# Patient Record
Sex: MALE | Race: WHITE | NOT HISPANIC OR LATINO | Employment: FULL TIME | URBAN - METROPOLITAN AREA
[De-identification: names, ages, dates, MRNs, and addresses within clinical notes are randomized per-mention and may not be internally consistent; named-entity substitution may affect disease eponyms.]

---

## 2019-11-13 ENCOUNTER — HOSPITAL ENCOUNTER (INPATIENT)
Facility: HOSPITAL | Age: 59
LOS: 2 days | Discharge: HOME | DRG: 246 | End: 2019-11-15
Attending: EMERGENCY MEDICINE | Admitting: INTERNAL MEDICINE
Payer: COMMERCIAL

## 2019-11-13 DIAGNOSIS — I24.9 ACS (ACUTE CORONARY SYNDROME): ICD-10-CM

## 2019-11-13 DIAGNOSIS — R07.9 CHEST PAIN: ICD-10-CM

## 2019-11-13 DIAGNOSIS — I46.9 CARDIAC ARREST: Primary | ICD-10-CM

## 2019-11-13 DIAGNOSIS — I24.9 ACUTE ISCHEMIC HEART DISEASE, UNSPECIFIED: ICD-10-CM

## 2019-11-13 DIAGNOSIS — I25.10 CAD (CORONARY ARTERY DISEASE): ICD-10-CM

## 2019-11-13 PROBLEM — I25.700 CORONARY ARTERY DISEASE INVOLVING CORONARY BYPASS GRAFT OF NATIVE HEART WITH UNSTABLE ANGINA PECTORIS: Status: ACTIVE | Noted: 2019-11-13

## 2019-11-13 LAB
ALBUMIN SERPL BCP-MCNC: 4.6 G/DL (ref 3.5–5.2)
ALP SERPL-CCNC: 64 U/L (ref 55–135)
ALT SERPL W/O P-5'-P-CCNC: 25 U/L (ref 10–44)
ANION GAP SERPL CALC-SCNC: 13 MMOL/L (ref 8–16)
AST SERPL-CCNC: 43 U/L (ref 10–40)
BASOPHILS # BLD AUTO: 0.04 K/UL (ref 0–0.2)
BASOPHILS NFR BLD: 0.3 % (ref 0–1.9)
BILIRUB SERPL-MCNC: 1.2 MG/DL (ref 0.1–1)
BNP SERPL-MCNC: 25 PG/ML (ref 0–99)
BUN SERPL-MCNC: 15 MG/DL (ref 6–20)
CALCIUM SERPL-MCNC: 9.8 MG/DL (ref 8.7–10.5)
CHLORIDE SERPL-SCNC: 105 MMOL/L (ref 95–110)
CO2 SERPL-SCNC: 22 MMOL/L (ref 23–29)
CORONARY STENOSIS: ABNORMAL
CORONARY STENT: YES
CREAT SERPL-MCNC: 1 MG/DL (ref 0.5–1.4)
DIASTOLIC DYSFUNCTION: NO
DIFFERENTIAL METHOD: ABNORMAL
EOSINOPHIL # BLD AUTO: 0.1 K/UL (ref 0–0.5)
EOSINOPHIL NFR BLD: 0.6 % (ref 0–8)
ERYTHROCYTE [DISTWIDTH] IN BLOOD BY AUTOMATED COUNT: 12.3 % (ref 11.5–14.5)
EST. GFR  (AFRICAN AMERICAN): >60 ML/MIN/1.73 M^2
EST. GFR  (NON AFRICAN AMERICAN): >60 ML/MIN/1.73 M^2
ESTIMATED PA SYSTOLIC PRESSURE: 26.83
GLUCOSE SERPL-MCNC: 114 MG/DL (ref 70–110)
HCT VFR BLD AUTO: 40.8 % (ref 40–54)
HCV AB SERPL QL IA: NEGATIVE
HGB BLD-MCNC: 13.6 G/DL (ref 14–18)
HIV 1+2 AB+HIV1 P24 AG SERPL QL IA: NEGATIVE
IMM GRANULOCYTES # BLD AUTO: 0.44 K/UL (ref 0–0.04)
IMM GRANULOCYTES NFR BLD AUTO: 3.1 % (ref 0–0.5)
LYMPHOCYTES # BLD AUTO: 0.9 K/UL (ref 1–4.8)
LYMPHOCYTES NFR BLD: 6 % (ref 18–48)
MCH RBC QN AUTO: 29.6 PG (ref 27–31)
MCHC RBC AUTO-ENTMCNC: 33.3 G/DL (ref 32–36)
MCV RBC AUTO: 89 FL (ref 82–98)
MONOCYTES # BLD AUTO: 0.8 K/UL (ref 0.3–1)
MONOCYTES NFR BLD: 5.8 % (ref 4–15)
NEUTROPHILS # BLD AUTO: 12.1 K/UL (ref 1.8–7.7)
NEUTROPHILS NFR BLD: 84.2 % (ref 38–73)
NRBC BLD-RTO: 0 /100 WBC
PLATELET # BLD AUTO: 237 K/UL (ref 150–350)
PMV BLD AUTO: 10.3 FL (ref 9.2–12.9)
POC ACTIVATED CLOTTING TIME K: 158 SEC (ref 74–137)
POC ACTIVATED CLOTTING TIME K: 252 SEC (ref 74–137)
POC ACTIVATED CLOTTING TIME K: 301 SEC (ref 74–137)
POTASSIUM SERPL-SCNC: 4.4 MMOL/L (ref 3.5–5.1)
PROT SERPL-MCNC: 7.7 G/DL (ref 6–8.4)
RBC # BLD AUTO: 4.59 M/UL (ref 4.6–6.2)
RETIRED EF AND QEF - SEE NOTES: 50 (ref 55–65)
SAMPLE: ABNORMAL
SODIUM SERPL-SCNC: 140 MMOL/L (ref 136–145)
TRICUSPID VALVE REGURGITATION: NORMAL
TROPONIN I SERPL DL<=0.01 NG/ML-MCNC: 18.5 NG/ML (ref 0–0.03)
TROPONIN I SERPL DL<=0.01 NG/ML-MCNC: <0.006 NG/ML (ref 0–0.03)
WBC # BLD AUTO: 14.31 K/UL (ref 3.9–12.7)

## 2019-11-13 PROCEDURE — 99152 CATH LAB PROCEDURE: ICD-10-PCS | Mod: ,,, | Performed by: INTERNAL MEDICINE

## 2019-11-13 PROCEDURE — 84484 ASSAY OF TROPONIN QUANT: CPT | Mod: 91

## 2019-11-13 PROCEDURE — 25500020 PHARM REV CODE 255

## 2019-11-13 PROCEDURE — 63600175 PHARM REV CODE 636 W HCPCS: Performed by: INTERNAL MEDICINE

## 2019-11-13 PROCEDURE — C1769 GUIDE WIRE: HCPCS

## 2019-11-13 PROCEDURE — 99223 PR INITIAL HOSPITAL CARE,LEVL III: ICD-10-PCS | Mod: 25,,, | Performed by: INTERNAL MEDICINE

## 2019-11-13 PROCEDURE — 86703 HIV-1/HIV-2 1 RESULT ANTBDY: CPT

## 2019-11-13 PROCEDURE — 96375 TX/PRO/DX INJ NEW DRUG ADDON: CPT

## 2019-11-13 PROCEDURE — 92973 CATH LAB PROCEDURE: ICD-10-PCS | Mod: LC,,, | Performed by: INTERNAL MEDICINE

## 2019-11-13 PROCEDURE — 25000003 PHARM REV CODE 250

## 2019-11-13 PROCEDURE — 85025 COMPLETE CBC W/AUTO DIFF WBC: CPT

## 2019-11-13 PROCEDURE — 99223 1ST HOSP IP/OBS HIGH 75: CPT | Mod: 25,,, | Performed by: INTERNAL MEDICINE

## 2019-11-13 PROCEDURE — 92937 CATH LAB PROCEDURE: ICD-10-PCS | Mod: LC,,, | Performed by: INTERNAL MEDICINE

## 2019-11-13 PROCEDURE — 36415 COLL VENOUS BLD VENIPUNCTURE: CPT

## 2019-11-13 PROCEDURE — 20000000 HC ICU ROOM

## 2019-11-13 PROCEDURE — 99223 PR INITIAL HOSPITAL CARE,LEVL III: ICD-10-PCS | Mod: ,,, | Performed by: NURSE PRACTITIONER

## 2019-11-13 PROCEDURE — 93459 CATH LAB PROCEDURE: ICD-10-PCS | Mod: 26,59,, | Performed by: INTERNAL MEDICINE

## 2019-11-13 PROCEDURE — 84484 ASSAY OF TROPONIN QUANT: CPT

## 2019-11-13 PROCEDURE — 99291 CRITICAL CARE FIRST HOUR: CPT | Mod: 25

## 2019-11-13 PROCEDURE — 86803 HEPATITIS C AB TEST: CPT

## 2019-11-13 PROCEDURE — 99223 1ST HOSP IP/OBS HIGH 75: CPT | Mod: ,,, | Performed by: NURSE PRACTITIONER

## 2019-11-13 PROCEDURE — 25000003 PHARM REV CODE 250: Performed by: NURSE PRACTITIONER

## 2019-11-13 PROCEDURE — 93010 ELECTROCARDIOGRAM REPORT: CPT | Mod: ,,, | Performed by: INTERNAL MEDICINE

## 2019-11-13 PROCEDURE — 92937 PRQ TRLUML REVSC CAB GRF 1: CPT | Mod: RC,,, | Performed by: INTERNAL MEDICINE

## 2019-11-13 PROCEDURE — 93010 EKG 12-LEAD: ICD-10-PCS | Mod: ,,, | Performed by: INTERNAL MEDICINE

## 2019-11-13 PROCEDURE — 63600175 PHARM REV CODE 636 W HCPCS: Performed by: EMERGENCY MEDICINE

## 2019-11-13 PROCEDURE — 83880 ASSAY OF NATRIURETIC PEPTIDE: CPT

## 2019-11-13 PROCEDURE — 25000003 PHARM REV CODE 250: Performed by: EMERGENCY MEDICINE

## 2019-11-13 PROCEDURE — 92973 PRQ TRLUML C MCHN ASP THRMBC: CPT | Mod: LC,,, | Performed by: INTERNAL MEDICINE

## 2019-11-13 PROCEDURE — 99152 MOD SED SAME PHYS/QHP 5/>YRS: CPT

## 2019-11-13 PROCEDURE — 93005 ELECTROCARDIOGRAM TRACING: CPT

## 2019-11-13 PROCEDURE — 63600175 PHARM REV CODE 636 W HCPCS

## 2019-11-13 PROCEDURE — 99152 MOD SED SAME PHYS/QHP 5/>YRS: CPT | Mod: ,,, | Performed by: INTERNAL MEDICINE

## 2019-11-13 PROCEDURE — 93306 TTE W/DOPPLER COMPLETE: CPT | Mod: 26,,, | Performed by: INTERNAL MEDICINE

## 2019-11-13 PROCEDURE — 93459 L HRT ART/GRFT ANGIO: CPT | Mod: 26,59,, | Performed by: INTERNAL MEDICINE

## 2019-11-13 PROCEDURE — 25000003 PHARM REV CODE 250: Performed by: INTERNAL MEDICINE

## 2019-11-13 PROCEDURE — C1887 CATHETER, GUIDING: HCPCS

## 2019-11-13 PROCEDURE — 80053 COMPREHEN METABOLIC PANEL: CPT

## 2019-11-13 PROCEDURE — 93306 TTE W/DOPPLER COMPLETE: CPT

## 2019-11-13 PROCEDURE — 96374 THER/PROPH/DIAG INJ IV PUSH: CPT

## 2019-11-13 PROCEDURE — 99285 EMERGENCY DEPT VISIT HI MDM: CPT | Mod: 25

## 2019-11-13 PROCEDURE — 93306 2D ECHO WITH COLOR FLOW DOPPLER: ICD-10-PCS | Mod: 26,,, | Performed by: INTERNAL MEDICINE

## 2019-11-13 RX ORDER — LIDOCAINE 50 MG/G
1 PATCH TOPICAL
Status: DISCONTINUED | OUTPATIENT
Start: 2019-11-13 | End: 2019-11-15 | Stop reason: HOSPADM

## 2019-11-13 RX ORDER — EPINEPHRINE 0.1 MG/ML
INJECTION INTRAVENOUS CODE/TRAUMA/SEDATION MEDICATION
Status: COMPLETED | OUTPATIENT
Start: 2019-11-13 | End: 2019-11-13

## 2019-11-13 RX ORDER — CARVEDILOL 6.25 MG/1
6.25 TABLET ORAL 2 TIMES DAILY
Status: DISCONTINUED | OUTPATIENT
Start: 2019-11-13 | End: 2019-11-15 | Stop reason: HOSPADM

## 2019-11-13 RX ORDER — NITROGLYCERIN 0.4 MG/1
0.4 TABLET SUBLINGUAL EVERY 5 MIN PRN
Status: DISCONTINUED | OUTPATIENT
Start: 2019-11-13 | End: 2019-11-15 | Stop reason: HOSPADM

## 2019-11-13 RX ORDER — AMIODARONE HYDROCHLORIDE 150 MG/3ML
INJECTION, SOLUTION INTRAVENOUS CODE/TRAUMA/SEDATION MEDICATION
Status: COMPLETED | OUTPATIENT
Start: 2019-11-13 | End: 2019-11-13

## 2019-11-13 RX ORDER — ASPIRIN 325 MG
325 TABLET ORAL
Status: COMPLETED | OUTPATIENT
Start: 2019-11-13 | End: 2019-11-13

## 2019-11-13 RX ORDER — LOSARTAN POTASSIUM 25 MG/1
25 TABLET ORAL DAILY
Status: DISCONTINUED | OUTPATIENT
Start: 2019-11-13 | End: 2019-11-15 | Stop reason: HOSPADM

## 2019-11-13 RX ORDER — LIDOCAINE HCL/EPINEPHRINE/PF 2%-1:200K
1 VIAL (ML) INJECTION ONCE
Status: COMPLETED | OUTPATIENT
Start: 2019-11-13 | End: 2019-11-13

## 2019-11-13 RX ORDER — HEPARIN SODIUM 5000 [USP'U]/ML
4000 INJECTION, SOLUTION INTRAVENOUS; SUBCUTANEOUS
Status: COMPLETED | OUTPATIENT
Start: 2019-11-13 | End: 2019-11-13

## 2019-11-13 RX ORDER — ACETAMINOPHEN 500 MG
1000 TABLET ORAL EVERY 6 HOURS PRN
Status: DISCONTINUED | OUTPATIENT
Start: 2019-11-13 | End: 2019-11-15 | Stop reason: HOSPADM

## 2019-11-13 RX ORDER — TIROFIBAN HYDROCHLORIDE 50 UG/ML
0.15 INJECTION INTRAVENOUS CONTINUOUS
Status: DISCONTINUED | OUTPATIENT
Start: 2019-11-13 | End: 2019-11-14

## 2019-11-13 RX ORDER — ATORVASTATIN CALCIUM 40 MG/1
80 TABLET, FILM COATED ORAL NIGHTLY
Status: DISCONTINUED | OUTPATIENT
Start: 2019-11-13 | End: 2019-11-15 | Stop reason: HOSPADM

## 2019-11-13 RX ORDER — HEPARIN SODIUM,PORCINE/D5W 25000/250
12 INTRAVENOUS SOLUTION INTRAVENOUS CONTINUOUS
Status: DISCONTINUED | OUTPATIENT
Start: 2019-11-13 | End: 2019-11-13

## 2019-11-13 RX ORDER — PANTOPRAZOLE SODIUM 40 MG/1
40 TABLET, DELAYED RELEASE ORAL DAILY
Status: DISCONTINUED | OUTPATIENT
Start: 2019-11-14 | End: 2019-11-15 | Stop reason: HOSPADM

## 2019-11-13 RX ORDER — AMIODARONE HYDROCHLORIDE 150 MG/3ML
300 INJECTION, SOLUTION INTRAVENOUS
Status: DISCONTINUED | OUTPATIENT
Start: 2019-11-13 | End: 2019-11-13

## 2019-11-13 RX ORDER — SODIUM CHLORIDE 9 MG/ML
INJECTION, SOLUTION INTRAVENOUS CONTINUOUS
Status: DISCONTINUED | OUTPATIENT
Start: 2019-11-13 | End: 2019-11-14

## 2019-11-13 RX ORDER — ASPIRIN 81 MG/1
81 TABLET ORAL DAILY
Status: DISCONTINUED | OUTPATIENT
Start: 2019-11-14 | End: 2019-11-15 | Stop reason: HOSPADM

## 2019-11-13 RX ORDER — METOPROLOL TARTRATE 1 MG/ML
5 INJECTION, SOLUTION INTRAVENOUS
Status: COMPLETED | OUTPATIENT
Start: 2019-11-13 | End: 2019-11-13

## 2019-11-13 RX ORDER — ONDANSETRON 2 MG/ML
4 INJECTION INTRAMUSCULAR; INTRAVENOUS EVERY 12 HOURS PRN
Status: DISCONTINUED | OUTPATIENT
Start: 2019-11-13 | End: 2019-11-15 | Stop reason: HOSPADM

## 2019-11-13 RX ORDER — NITROGLYCERIN 0.4 MG/1
0.4 TABLET SUBLINGUAL
Status: COMPLETED | OUTPATIENT
Start: 2019-11-13 | End: 2019-11-13

## 2019-11-13 RX ORDER — ONDANSETRON 2 MG/ML
4 INJECTION INTRAMUSCULAR; INTRAVENOUS
Status: COMPLETED | OUTPATIENT
Start: 2019-11-13 | End: 2019-11-13

## 2019-11-13 RX ORDER — ONDANSETRON 2 MG/ML
8 INJECTION INTRAMUSCULAR; INTRAVENOUS
Status: DISCONTINUED | OUTPATIENT
Start: 2019-11-13 | End: 2019-11-13

## 2019-11-13 RX ORDER — TIROFIBAN HYDROCHLORIDE 50 UG/ML
25 INJECTION INTRAVENOUS ONCE
Status: COMPLETED | OUTPATIENT
Start: 2019-11-13 | End: 2019-11-13

## 2019-11-13 RX ORDER — AMIODARONE HYDROCHLORIDE 150 MG/3ML
150 INJECTION, SOLUTION INTRAVENOUS
Status: DISCONTINUED | OUTPATIENT
Start: 2019-11-13 | End: 2019-11-13

## 2019-11-13 RX ADMIN — METOPROLOL TARTRATE 5 MG: 5 INJECTION, SOLUTION INTRAVENOUS at 01:11

## 2019-11-13 RX ADMIN — EPINEPHRINE 1 MG: 0.1 INJECTION, SOLUTION ENDOTRACHEAL; INTRACARDIAC; INTRAVENOUS at 01:11

## 2019-11-13 RX ADMIN — HEPARIN SODIUM 4000 UNITS: 5000 INJECTION INTRAVENOUS; SUBCUTANEOUS at 01:11

## 2019-11-13 RX ADMIN — LOSARTAN POTASSIUM 25 MG: 25 TABLET ORAL at 05:11

## 2019-11-13 RX ADMIN — NITROGLYCERIN 0.4 MG: 0.4 TABLET, ORALLY DISINTEGRATING SUBLINGUAL at 01:11

## 2019-11-13 RX ADMIN — ACETAMINOPHEN 1000 MG: 500 TABLET ORAL at 07:11

## 2019-11-13 RX ADMIN — TIROFIBAN HYDROCHLORIDE 2327.5 MCG: 50 INJECTION INTRAVENOUS at 04:11

## 2019-11-13 RX ADMIN — METOPROLOL TARTRATE 5 MG: 5 INJECTION INTRAVENOUS at 01:11

## 2019-11-13 RX ADMIN — ASPIRIN 325 MG ORAL TABLET 325 MG: 325 PILL ORAL at 01:11

## 2019-11-13 RX ADMIN — NITROGLYCERIN 1 INCH: 20 OINTMENT TOPICAL at 01:11

## 2019-11-13 RX ADMIN — LIDOCAINE HYDROCHLORIDE,EPINEPHRINE BITARTRATE: 20; .005 INJECTION, SOLUTION EPIDURAL; INFILTRATION; INTRACAUDAL; PERINEURAL at 05:11

## 2019-11-13 RX ADMIN — ONDANSETRON 4 MG: 2 INJECTION INTRAMUSCULAR; INTRAVENOUS at 01:11

## 2019-11-13 RX ADMIN — TIROFIBAN 0.15 MCG/KG/MIN: 5 INJECTION, SOLUTION INTRAVENOUS at 05:11

## 2019-11-13 RX ADMIN — CARVEDILOL 6.25 MG: 6.25 TABLET, FILM COATED ORAL at 08:11

## 2019-11-13 RX ADMIN — AMIODARONE HYDROCHLORIDE 150 MG: 50 INJECTION, SOLUTION INTRAVENOUS at 01:11

## 2019-11-13 RX ADMIN — HEPARIN SODIUM 12 UNITS/KG/HR: 10000 INJECTION, SOLUTION INTRAVENOUS at 01:11

## 2019-11-13 RX ADMIN — ATORVASTATIN CALCIUM 80 MG: 40 TABLET, FILM COATED ORAL at 08:11

## 2019-11-13 RX ADMIN — LIDOCAINE 1 PATCH: 50 PATCH TOPICAL at 09:11

## 2019-11-13 RX ADMIN — SODIUM CHLORIDE: 0.9 INJECTION, SOLUTION INTRAVENOUS at 05:11

## 2019-11-13 NOTE — ED NOTES
Patient c/o chest pain 30 minutes PTA to ER today, chest pain is described at sharp. Patient reports previous cardiac history including CABG x 5.    Patient moved to ED room 17, patient assisted onto stretcher and changed into a gown. Patient placed on cardiac monitor, continuous pulse oximetry and automatic blood pressure cuff. Bed placed in low locked position, side rails up x 2, call light is within reach of patient or family, orientation to room and explanation of wait provided to family and patient, alarms set and turned on for monitor and pulse ox, awaiting MD evaluation and orders, will continue to monitor.    Patient identifies self as Deondre Monson.    LOC: The patient is awake, alert and aware of environment with an appropriate affect, the patient is oriented x 3 and speaking appropriately.  APPEARANCE: Patient resting comfortably and in no acute distress, patient is clean and well groomed, patient's clothing is properly fastened.  SKIN: The skin is warm and dry, color consistent with ethnicity, patient has normal skin turgor and moist mucus membranes, skin intact, no breakdown or bruising noted.  MUSCULOSKELETAL: Patient moving all extremities well, no obvious swelling or deformities noted.  RESPIRATORY: Airway is open and patent, respirations are spontaneous, patient has a normal effort and rate, no accessory muscle use noted.  CARDIAC: Patient has no periphreal edema noted, capillary refill < 3 seconds.  ABDOMEN: Soft and non tender to palpation, no distention noted.  NEUROLOGIC: PERRL, 3 mm bilaterally, eyes open spontaneously, behavior appropriate to situation, follows commands, facial expression symmetrical, bilateral hand grasp equal and even, purposeful motor response noted, normal sensation in all extremities when touched with a finger.

## 2019-11-13 NOTE — OP NOTE
INPATIENT Operative Note         SUMMARY     Surgery Date: 11/13/2019     Surgeon(s) and Role:     * Masha Castro MD - Primary    ASSISTANT:NONE    Pre-op Diagnosis:  Cardiac arrest [I46.9]      Post-op Diagnosis:  CAD    Procedure(s) (LRB):  CATHETERIZATION, HEART, LEFT (Left)  THROMBECTOMY SVG TOP OM STENT SVG TO OM   STENT SVG TO PDA PERCLOSE  COMPLICATION:NONE    Anesthesia: RN IV Sedation    Findings/Key Components:  OCCLUDED SVG TO OM TREATED WITH STENTING AND THROMBECTOMY   SVG TO PDA 90% INSTENT TREATED WITH ORSIRO STENT POST PTCA WITH 3.5 AT 24 KENDALL.  LAD OCCLUDED LCX OCCLUDED   RCA OCCLUDED.   SVG TO D1 OCCLUDED   LIMA TO LAD PATENT   EF 40%    Estimated Blood Loss: < 50 ML.         SPECIMEN: NONE    Devices/Prostetics: ORSIRO STENTX2     PLAN:  ROUTINE POST STENT CARE.

## 2019-11-13 NOTE — CODE/ RAPID DOCUMENTATION
Pt became unresponsive and went into pulseless VF, Eric PAPPAS at , CPR initiated immediately. Crash cart being brought to bedside.

## 2019-11-13 NOTE — SUBJECTIVE & OBJECTIVE
No past medical history on file.    No past surgical history on file.    Review of patient's allergies indicates:  No Known Allergies    No current facility-administered medications on file prior to encounter.      No current outpatient medications on file prior to encounter.     Family History     None        Tobacco Use    Smoking status: Not on file   Substance and Sexual Activity    Alcohol use: Not on file    Drug use: Not on file    Sexual activity: Not on file     Review of Systems   Constitution: Positive for malaise/fatigue.   HENT: Negative for hearing loss and hoarse voice.    Eyes: Negative for blurred vision and visual disturbance.   Cardiovascular: Positive for chest pain (radiation to bilateral jaw). Negative for claudication, dyspnea on exertion, irregular heartbeat, leg swelling, near-syncope, orthopnea, palpitations, paroxysmal nocturnal dyspnea and syncope.   Respiratory: Negative for cough, hemoptysis, shortness of breath, sleep disturbances due to breathing, snoring and wheezing.    Endocrine: Negative for cold intolerance and heat intolerance.   Hematologic/Lymphatic: Bruises/bleeds easily.   Skin: Negative for color change, dry skin and nail changes.   Musculoskeletal: Positive for arthritis. Negative for back pain, joint pain and myalgias.   Gastrointestinal: Negative for bloating, abdominal pain, constipation, nausea and vomiting.   Genitourinary: Negative for dysuria, flank pain, hematuria and hesitancy.   Neurological: Negative for headaches, light-headedness, loss of balance, numbness, paresthesias and weakness.   Psychiatric/Behavioral: Negative for altered mental status.   Allergic/Immunologic: Negative for environmental allergies.     Objective:     Vital Signs (Most Recent):  Pulse: 84 (11/13/19 1305)  Resp: 16 (11/13/19 1305)  BP: (!) 153/91 (11/13/19 1305)  SpO2: 97 % (11/13/19 1305) Vital Signs (24h Range):  Pulse:  [84] 84  Resp:  [16] 16  SpO2:  [97 %] 97 %  BP: (153)/(91)  153/91     Weight: 93.1 kg (205 lb 3.2 oz)  Body mass index is 27.07 kg/m².    SpO2: 97 %  O2 Device (Oxygen Therapy): room air    No intake or output data in the 24 hours ending 11/13/19 1339    Lines/Drains/Airways     None                 Physical Exam   Constitutional: He is oriented to person, place, and time. He appears well-developed and well-nourished. No distress.   HENT:   Head: Normocephalic and atraumatic.   Eyes: Pupils are equal, round, and reactive to light.   Neck: Normal range of motion and full passive range of motion without pain. Neck supple. No JVD present.   Cardiovascular: Normal rate, S1 normal, S2 normal and intact distal pulses. An irregular rhythm present. Frequent extrasystoles are present. PMI is not displaced. Exam reveals no distant heart sounds.   No murmur heard.  Pulses:       Radial pulses are 2+ on the right side, and 2+ on the left side.        Dorsalis pedis pulses are 2+ on the right side, and 2+ on the left side.   Complains of mid-sternal chest pain with radiation to bilateral jaw 5/10   Pulmonary/Chest: Effort normal and breath sounds normal. No accessory muscle usage. No respiratory distress. He has no decreased breath sounds. He has no wheezes. He has no rales.   Abdominal: Soft. Bowel sounds are normal. He exhibits no distension. There is no tenderness.   Musculoskeletal: Normal range of motion. He exhibits no edema.        Right ankle: He exhibits no swelling.        Left ankle: He exhibits no swelling.   Neurological: He is alert and oriented to person, place, and time.   Skin: Skin is warm and dry. He is not diaphoretic. No cyanosis. Nails show no clubbing.   Psychiatric: He has a normal mood and affect. His speech is normal and behavior is normal. Judgment and thought content normal. Cognition and memory are normal.   Nursing note and vitals reviewed.      Significant Labs: BMP: No results for input(s): GLU, NA, K, CL, CO2, BUN, CREATININE, CALCIUM, MG in the last 48  hours., CBC No results for input(s): WBC, HGB, HCT, PLT in the last 48 hours., Troponin No results for input(s): TROPONINI in the last 48 hours. and All pertinent lab results from the last 24 hours have been reviewed.    Significant Imaging: Echocardiogram: 2D echo with color flow doppler: No results found for this or any previous visit.

## 2019-11-13 NOTE — ED NOTES
Spoke with wife, Marisela Monson, and updated on care. Wife is en route to [facility]. Contact number [phone].

## 2019-11-13 NOTE — ASSESSMENT & PLAN NOTE
Labs pending  Patient complains of ongoing mid-sternal chest pain with radiation to bilateral jaw  EKG with ST-T Wave abnormality, ST depression noted on EKG today  ECHO completed, pending  Start IV heparin gtt for ACS today  IV Lopressor x 2 doses  NTG SL followed by NTP 1 inch to chest wall today  Repeat EKG pending  Plan for Urgent LHC today per Dr. Castro  Request old Cath report from [facility], Dr. Shaan Bey\  Further recs to follow LHC today.     After Evaluation in ED, patient had episode of VFIB requiring defibrillation.  Patient will be brought to Cath Lab for Urgent LH per Dr. Castro.   Dr. Castro spoke to Dr. Bey regarding patient's last LHC results.

## 2019-11-13 NOTE — CODE/ RAPID DOCUMENTATION
Pt sitting up in bed vomiting at this time, Dr. Castro arrives to BS with plans to take pt to cath lab.

## 2019-11-13 NOTE — ED PROVIDER NOTES
SCRIBE #1 NOTE: I, Derrick Roddy, am scribing for, and in the presence of, Dave Moyer MD. I have scribed the entire note.      History      Chief Complaint   Patient presents with    Chest Pain       Review of patient's allergies indicates:  No Known Allergies     HPI   HPI    11/13/2019, 12:57 PM   History obtained from the patient      History of Present Illness: Deondre Monson is a 59 y.o. male patient with a PMHx of CAD s/p CABG and stent placement who presents to the Emergency Department for chest pain, onset 15 minutes PTA. Pt states that the pain radiates up to his jaw, and is currently a 7/10. Symptoms are constant and moderate in severity. No mitigating or exacerbating factors reported. No associated sxs reported. Patient denies any fever, chills, n/v/d, SOB, weakness, numbness, dizziness, headache, and all other sxs at this time. Pt is currently on Brilinta BID and 81 mg ASA daily . No further complaints or concerns at this time.     Arrival mode: Personal vehicle    PCP: No primary care provider on file.     Past Medical History:  No past medical history on file.    Past Surgical History:  No past surgical history on file.      Family History:  No family history on file.    Social History:  Social History     Tobacco Use    Smoking status: Not on file   Substance and Sexual Activity    Alcohol use: Not on file    Drug use: Not on file    Sexual activity: Not on file       ROS   Review of Systems   Constitutional: Negative for chills, diaphoresis, fatigue and fever.   HENT: Negative for sore throat.    Respiratory: Negative for shortness of breath.    Cardiovascular: Positive for chest pain.   Gastrointestinal: Negative for diarrhea, nausea and vomiting.   Genitourinary: Negative for dysuria.   Musculoskeletal: Positive for myalgias (jaw). Negative for back pain.   Skin: Negative for rash and wound.   Neurological: Negative for dizziness, seizures, weakness, light-headedness, numbness  and headaches.   Hematological: Does not bruise/bleed easily.   All other systems reviewed and are negative.    Physical Exam      Initial Vitals [11/13/19 1305]   BP Pulse Resp Temp SpO2   (!) 153/91 84 16 -- 97 %      MAP       --          Physical Exam  Nursing Notes and Vital Signs Reviewed.  Constitutional: Patient is in mild distress. Well-developed and well-nourished.  Head: Atraumatic. Normocephalic.  Eyes: PERRL. EOM intact. Conjunctivae are not pale. No scleral icterus.  ENT: Mucous membranes are moist. Oropharynx is clear and symmetric.    Neck: Supple. Full ROM. No lymphadenopathy.  Cardiovascular: Regular rate. Regular rhythm. No murmurs, rubs, or gallops. Distal pulses are 2+ and symmetric.  Pulmonary/Chest: No respiratory distress. Clear to auscultation bilaterally. No wheezing or rales.  Abdominal: Soft and non-distended.  There is no tenderness.  No rebound, guarding, or rigidity.   Musculoskeletal: Moves all extremities. No obvious deformities. No edema.   Skin: Warm and dry.  Neurological:  Alert, awake, and appropriate.  Normal speech.  No acute focal neurological deficits are appreciated.  Psychiatric: Normal affect. Good eye contact. Appropriate in content.    ED Course    Critical Care  Date/Time: 11/13/2019 2:02 PM  Performed by: Dave Moyer MD  Authorized by: Dave Moyer MD   Direct patient critical care time: 30 minutes  Additional history critical care time: 5 minutes  Ordering / reviewing critical care time: 5 minutes  Documentation critical care time: 5 minutes  Consulting other physicians critical care time: 5 minutes  Total critical care time (exclusive of procedural time) : 50 minutes  Critical care time was exclusive of separately billable procedures and treating other patients and teaching time.  Critical care was necessary to treat or prevent imminent or life-threatening deterioration of the following conditions: cardiac failure (Vfib cardiac arrest,  STEMI).  Critical care was time spent personally by me on the following activities: blood draw for specimens, development of treatment plan with patient or surrogate, discussions with consultants, interpretation of cardiac output measurements, evaluation of patient's response to treatment, examination of patient, obtaining history from patient or surrogate, ordering and performing treatments and interventions, ordering and review of laboratory studies, ordering and review of radiographic studies, pulse oximetry and re-evaluation of patient's condition.        ED Vital Signs:  Vitals:    11/13/19 1342 11/13/19 1343 11/13/19 1344 11/13/19 1345   BP: (!) 157/75   (!) 105/46   Pulse: 69 78 (!) 0 76   Resp: (!) 21 19 (!) 90 (!) 127   SpO2: 99% 99%  100%   Weight:       Height:        11/13/19 1346 11/13/19 1347 11/13/19 1348 11/13/19 1349   BP:   (!) 92/40    Pulse: (!) 0 87 (!) 166 (!) 148   Resp: (!) 114 (!) 41 (!) 37 (!) 33   SpO2:   97% (!) 93%   Weight:       Height:        11/13/19 1350 11/13/19 1351 11/13/19 1352 11/13/19 1353   BP: (!) 95/55      Pulse: (!) 144 (!) 178 (!) 165 103   Resp: (!) 37 (!) 33 (!) 30 (!) 37   SpO2: 95% (!) 91% (!) 84% 98%   Weight:       Height:        11/13/19 1354 11/13/19 1355 11/13/19 1400   BP:  (!) 178/84 124/73   Pulse: 91 86 85   Resp: (!) 27 (!) 34 (!) 30   SpO2: 100% 98% (!) 94%   Weight:      Height:          Abnormal Lab Results:  Labs Reviewed   COMPREHENSIVE METABOLIC PANEL - Abnormal; Notable for the following components:       Result Value    CO2 22 (*)     Glucose 114 (*)     Total Bilirubin 1.2 (*)     AST 43 (*)     All other components within normal limits   TROPONIN I   TROPONIN I        All Lab Results:  Results for orders placed or performed during the hospital encounter of 11/13/19   Comprehensive metabolic panel   Result Value Ref Range    Sodium 140 136 - 145 mmol/L    Potassium 4.4 3.5 - 5.1 mmol/L    Chloride 105 95 - 110 mmol/L    CO2 22 (L) 23 - 29 mmol/L     Glucose 114 (H) 70 - 110 mg/dL    BUN, Bld 15 6 - 20 mg/dL    Creatinine 1.0 0.5 - 1.4 mg/dL    Calcium 9.8 8.7 - 10.5 mg/dL    Total Protein 7.7 6.0 - 8.4 g/dL    Albumin 4.6 3.5 - 5.2 g/dL    Total Bilirubin 1.2 (H) 0.1 - 1.0 mg/dL    Alkaline Phosphatase 64 55 - 135 U/L    AST 43 (H) 10 - 40 U/L    ALT 25 10 - 44 U/L    Anion Gap 13 8 - 16 mmol/L    eGFR if African American >60 >60 mL/min/1.73 m^2    eGFR if non African American >60 >60 mL/min/1.73 m^2   Troponin I #1   Result Value Ref Range    Troponin I <0.006 0.000 - 0.026 ng/mL   HIV 1/2 Ag/Ab (4th Gen)   Result Value Ref Range    HIV 1/2 Ag/Ab Negative Negative   Hepatitis C antibody   Result Value Ref Range    Hepatitis C Ab Negative Negative     Imaging Results:  Imaging Results          X-Ray Chest AP Portable (No Result on File)                The EKG was ordered, reviewed, and independently interpreted by the ED provider.  Interpretation time: 12:59  Rate: 83 BPM  Rhythm: Sinus rhythm with frequent premature ventricular complexes in a pattern of bigeminy.  Interpretation: Possible left atrial enlargement. ST & T wave abnormality, consider anterior ischemia. Prolonged QT. No STEMI.             The Emergency Provider reviewed the vital signs and test results, which are outlined above.    ED Discussion     1:10 PM: Discussed pt's case with Dr. Castro (Cardiology) who recommends getting an echo.    1:49 PM: Pt has gone into Vfib cardiac arrest, and has been defibrillated at this time. Pulse is palpable, Dr. Castro (Cardiology) is at bedside.    1:55 PM: Repeat EKG reveals STEMI. Code STEMI called at this time.    1:56 PM: Discussed case with Dr. Castro (Cardiology), who recommends that the pt be admitted to cath lab immediately, and admission to Hospital Medicine afterwards.    1:57 PM: Re-evaluated pt. I have discussed test results, shared treatment plan, and the need for admission with patient at bedside. Pt expresses understanding at this time and agree  with all information. All questions answered. Pt has no further questions or concerns at this time. Pt is ready for admit.    5:04 PM: Discussed case with Margarita Perez NP (Hospital Medicine). Dr. Camp agrees with current care and management of pt and accepts admission.   Admitting Service: Hospital Medicine  Admitting Physician: Dr. Camp  Admit to: ICU      ED Medication(s):  Medications   heparin 25,000 units in dextrose 5% 250 mL (100 units/mL) infusion LOW INTENSITY nomogram - [facility] (12 Units/kg/hr × 93.1 kg Intravenous New Bag 11/13/19 1340)   nitroGLYCERIN SL tablet 0.4 mg (has no administration in time range)   heparin 25,000 units in dextrose 5% (100 units/ml) IV bolus from bag - ADDITIONAL PRN BOLUS - 60 units/kg (max bolus 4000 units) (has no administration in time range)   heparin 25,000 units in dextrose 5% (100 units/ml) IV bolus from bag - ADDITIONAL PRN BOLUS - 30 units/kg (max bolus 4000 units) (has no administration in time range)   amiodarone injection 150 mg (150 mg Intravenous Not Given 11/13/19 1349)   aspirin tablet 325 mg (325 mg Oral Given 11/13/19 1328)   nitroGLYCERIN SL tablet 0.4 mg (0.4 mg Sublingual Given 11/13/19 1328)   heparin (porcine) injection 4,000 Units (4,000 Units Intravenous Given 11/13/19 1329)   metoprolol injection 5 mg (5 mg Intravenous Given 11/13/19 1336)   metoprolol injection 5 mg (5 mg Intravenous Given 11/13/19 1332)   nitroGLYCERIN 2% TD oint ointment 1 inch (1 inch Topical (Top) Given 11/13/19 1335)   EPINEPHrine 0.1 mg/mL injection (1 mg Intravenous Given 11/13/19 1344)   amiodarone injection (150 mg Intravenous Given 11/13/19 1349)   ondansetron injection 4 mg (4 mg Intravenous Given 11/13/19 1351)   amiodarone 360 mg/200 mL (1.8 mg/mL) infusion (1 mg/min Intravenous New Bag 11/13/19 1351)       There are no discharge medications for this patient.        Medical Decision Making    Medical Decision Making:   Clinical Tests:   Lab Tests: Ordered and  Reviewed  Radiological Study: Ordered and Reviewed  Medical Tests: Ordered and Reviewed           Scribe Attestation:   Scribe #1: I performed the above scribed service and the documentation accurately describes the services I performed. I attest to the accuracy of the note.    Attending:   Physician Attestation Statement for Scribe #1: I, Dave Moyer MD, personally performed the services described in this documentation, as scribed by Derrick Pereyra, in my presence, and it is both accurate and complete.          Clinical Impression       ICD-10-CM ICD-9-CM   1. Cardiac arrest I46.9 427.5   2. Chest pain R07.9 786.50   3. ACS (acute coronary syndrome) I24.9 411.1       Disposition:   Disposition: Admitted (to cath lab)  Condition: Serious         Dave Moyer MD  11/14/19 0638
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
## 2019-11-13 NOTE — CODE/ RAPID DOCUMENTATION
Pt taken to Cath Lab at this time. Pt provided number for his wife and asked RN staff to call her and notify her that he will be moved to Cath Lab.

## 2019-11-13 NOTE — CONSULTS
[facility]  Cardiology  Consult Note    Patient Name: Deondre Monson  MRN: [id number]  Admission Date: 11/13/2019  Hospital Length of Stay: 0 days  Code Status: No Order   Attending Provider: Dave Moyer,Bahman   Consulting Provider: VIKA Zafar  Primary Care Physician: No primary care provider on file.  Principal Problem:Acute coronary syndrome    Patient information was obtained from patient, past medical records and ER records.     Inpatient consult to Cardiology  Consult performed by: VIKA Palacio  Consult ordered by: Dave Moyer MD        Subjective:     Chief Complaint:  Chest pain     HPI:   Deondre Monson is a 59 year old male who presented to [facility] due to sudden onset of chest pain with radiation to bilateral jaws while driving today. He is a resident of [place] and is followed by Dr. Shaan Bey. His current medical conditions include CAD s/p CABG x 5V (2013), s/p PCI after CABG, HTN, HLP, PVCs. Cardiology consulted to assist with medical management. Chart reviewed, patient seen and examined in ED. He continues to complain of midsternal chest pain with radiation to bilateral jaws 5/10 at time of exam. Will attempt to obtain Recent Cath Report from Dr. Bey's office. Denies shortness of breath, MATHIS or palpitations. NO leg swelling or claudications. STAT ECHO pending. Will start IV heparin gtt for ACS protocol and given IV lopressor x 2 doses today. Will give additional NTG SL x 1 then NTP 1 inch today.Plans for urgent LHC today.Further recs to follow.     No past medical history on file.    No past surgical history on file.    Review of patient's allergies indicates:  No Known Allergies    No current facility-administered medications on file prior to encounter.      No current outpatient medications on file prior to encounter.     Family History     None        Tobacco Use    Smoking status: Not on file   Substance and Sexual Activity     Alcohol use: Not on file    Drug use: Not on file    Sexual activity: Not on file     Review of Systems   Constitution: Positive for malaise/fatigue.   HENT: Negative for hearing loss and hoarse voice.    Eyes: Negative for blurred vision and visual disturbance.   Cardiovascular: Positive for chest pain (radiation to bilateral jaw). Negative for claudication, dyspnea on exertion, irregular heartbeat, leg swelling, near-syncope, orthopnea, palpitations, paroxysmal nocturnal dyspnea and syncope.   Respiratory: Negative for cough, hemoptysis, shortness of breath, sleep disturbances due to breathing, snoring and wheezing.    Endocrine: Negative for cold intolerance and heat intolerance.   Hematologic/Lymphatic: Bruises/bleeds easily.   Skin: Negative for color change, dry skin and nail changes.   Musculoskeletal: Positive for arthritis. Negative for back pain, joint pain and myalgias.   Gastrointestinal: Negative for bloating, abdominal pain, constipation, nausea and vomiting.   Genitourinary: Negative for dysuria, flank pain, hematuria and hesitancy.   Neurological: Negative for headaches, light-headedness, loss of balance, numbness, paresthesias and weakness.   Psychiatric/Behavioral: Negative for altered mental status.   Allergic/Immunologic: Negative for environmental allergies.     Objective:     Vital Signs (Most Recent):  Pulse: 84 (11/13/19 1305)  Resp: 16 (11/13/19 1305)  BP: (!) 153/91 (11/13/19 1305)  SpO2: 97 % (11/13/19 1305) Vital Signs (24h Range):  Pulse:  [84] 84  Resp:  [16] 16  SpO2:  [97 %] 97 %  BP: (153)/(91) 153/91     Weight: 93.1 kg (205 lb 3.2 oz)  Body mass index is 27.07 kg/m².    SpO2: 97 %  O2 Device (Oxygen Therapy): room air    No intake or output data in the 24 hours ending 11/13/19 1339    Lines/Drains/Airways     None                 Physical Exam   Constitutional: He is oriented to person, place, and time. He appears well-developed and well-nourished. No distress.   HENT:   Head:  Normocephalic and atraumatic.   Eyes: Pupils are equal, round, and reactive to light.   Neck: Normal range of motion and full passive range of motion without pain. Neck supple. No JVD present.   Cardiovascular: Normal rate, S1 normal, S2 normal and intact distal pulses. An irregular rhythm present. Frequent extrasystoles are present. PMI is not displaced. Exam reveals no distant heart sounds.   No murmur heard.  Pulses:       Radial pulses are 2+ on the right side, and 2+ on the left side.        Dorsalis pedis pulses are 2+ on the right side, and 2+ on the left side.   Complains of mid-sternal chest pain with radiation to bilateral jaw 5/10   Pulmonary/Chest: Effort normal and breath sounds normal. No accessory muscle usage. No respiratory distress. He has no decreased breath sounds. He has no wheezes. He has no rales.   Abdominal: Soft. Bowel sounds are normal. He exhibits no distension. There is no tenderness.   Musculoskeletal: Normal range of motion. He exhibits no edema.        Right ankle: He exhibits no swelling.        Left ankle: He exhibits no swelling.   Neurological: He is alert and oriented to person, place, and time.   Skin: Skin is warm and dry. He is not diaphoretic. No cyanosis. Nails show no clubbing.   Psychiatric: He has a normal mood and affect. His speech is normal and behavior is normal. Judgment and thought content normal. Cognition and memory are normal.   Nursing note and vitals reviewed.      Significant Labs: BMP: No results for input(s): GLU, NA, K, CL, CO2, BUN, CREATININE, CALCIUM, MG in the last 48 hours., CBC No results for input(s): WBC, HGB, HCT, PLT in the last 48 hours., Troponin No results for input(s): TROPONINI in the last 48 hours. and All pertinent lab results from the last 24 hours have been reviewed.    Significant Imaging: Echocardiogram: 2D echo with color flow doppler: No results found for this or any previous visit.    Assessment and Plan:     * Acute coronary  syndrome  Labs pending  Patient complains of ongoing mid-sternal chest pain with radiation to bilateral jaw  EKG with ST-T Wave abnormality, ST depression noted on EKG today  ECHO completed, pending  Start IV heparin gtt for ACS today  IV Lopressor x 2 doses  NTG SL followed by NTP 1 inch to chest wall today  Repeat EKG pending  Plan for Urgent LHC today per Dr. Castro  Request old Cath report from [facility], Dr. Shaan Bey\  Further recs to follow LHC today.     After Evaluation in ED, patient had episode of VFIB requiring defibrillation.  Patient will be brought to Cath Lab for Urgent LH per Dr. Castro.   Dr. Castro spoke to Dr. Bey regarding patient's last LHC results.     Coronary artery disease involving coronary bypass graft of native heart with unstable angina pectoris  See plan for ACS        VTE Risk Mitigation (From admission, onward)         Ordered     heparin 25,000 units in dextrose 5% (100 units/ml) IV bolus from bag - ADDITIONAL PRN BOLUS - 60 units/kg (max bolus 4000 units)  As needed (PRN)      11/13/19 1334     heparin 25,000 units in dextrose 5% (100 units/ml) IV bolus from bag - ADDITIONAL PRN BOLUS - 30 units/kg (max bolus 4000 units)  As needed (PRN)      11/13/19 1334     heparin 25,000 units in dextrose 5% 250 mL (100 units/mL) infusion LOW INTENSITY nomogram - [facility]  Continuous      11/13/19 1329                Thank you for your consult. I will follow-up with patient. Please contact us if you have any additional questions.    Coral Gale, SANYA-C  Cardiology   [facility]

## 2019-11-14 PROBLEM — D72.829 LEUKOCYTOSIS: Status: ACTIVE | Noted: 2019-11-14

## 2019-11-14 LAB
ANION GAP SERPL CALC-SCNC: 9 MMOL/L (ref 8–16)
BASOPHILS # BLD AUTO: 0.02 K/UL (ref 0–0.2)
BASOPHILS # BLD AUTO: 0.02 K/UL (ref 0–0.2)
BASOPHILS NFR BLD: 0.2 % (ref 0–1.9)
BASOPHILS NFR BLD: 0.2 % (ref 0–1.9)
BUN SERPL-MCNC: 12 MG/DL (ref 6–20)
CALCIUM SERPL-MCNC: 8.7 MG/DL (ref 8.7–10.5)
CHLORIDE SERPL-SCNC: 104 MMOL/L (ref 95–110)
CO2 SERPL-SCNC: 22 MMOL/L (ref 23–29)
CREAT SERPL-MCNC: 0.9 MG/DL (ref 0.5–1.4)
DIFFERENTIAL METHOD: ABNORMAL
DIFFERENTIAL METHOD: ABNORMAL
EOSINOPHIL # BLD AUTO: 0 K/UL (ref 0–0.5)
EOSINOPHIL # BLD AUTO: 0 K/UL (ref 0–0.5)
EOSINOPHIL NFR BLD: 0.4 % (ref 0–8)
EOSINOPHIL NFR BLD: 0.4 % (ref 0–8)
ERYTHROCYTE [DISTWIDTH] IN BLOOD BY AUTOMATED COUNT: 12.6 % (ref 11.5–14.5)
ERYTHROCYTE [DISTWIDTH] IN BLOOD BY AUTOMATED COUNT: 12.6 % (ref 11.5–14.5)
EST. GFR  (AFRICAN AMERICAN): >60 ML/MIN/1.73 M^2
EST. GFR  (NON AFRICAN AMERICAN): >60 ML/MIN/1.73 M^2
ESTIMATED AVG GLUCOSE: 108 MG/DL (ref 68–131)
GLUCOSE SERPL-MCNC: 148 MG/DL (ref 70–110)
HBA1C MFR BLD HPLC: 5.4 % (ref 4–5.6)
HCT VFR BLD AUTO: 43.1 % (ref 40–54)
HCT VFR BLD AUTO: 43.1 % (ref 40–54)
HGB BLD-MCNC: 14.2 G/DL (ref 14–18)
HGB BLD-MCNC: 14.2 G/DL (ref 14–18)
IMM GRANULOCYTES # BLD AUTO: 0.05 K/UL (ref 0–0.04)
IMM GRANULOCYTES # BLD AUTO: 0.05 K/UL (ref 0–0.04)
IMM GRANULOCYTES NFR BLD AUTO: 0.5 % (ref 0–0.5)
IMM GRANULOCYTES NFR BLD AUTO: 0.5 % (ref 0–0.5)
LYMPHOCYTES # BLD AUTO: 1 K/UL (ref 1–4.8)
LYMPHOCYTES # BLD AUTO: 1 K/UL (ref 1–4.8)
LYMPHOCYTES NFR BLD: 8.8 % (ref 18–48)
LYMPHOCYTES NFR BLD: 8.8 % (ref 18–48)
MCH RBC QN AUTO: 30.3 PG (ref 27–31)
MCH RBC QN AUTO: 30.3 PG (ref 27–31)
MCHC RBC AUTO-ENTMCNC: 32.9 G/DL (ref 32–36)
MCHC RBC AUTO-ENTMCNC: 32.9 G/DL (ref 32–36)
MCV RBC AUTO: 92 FL (ref 82–98)
MCV RBC AUTO: 92 FL (ref 82–98)
MONOCYTES # BLD AUTO: 1.1 K/UL (ref 0.3–1)
MONOCYTES # BLD AUTO: 1.1 K/UL (ref 0.3–1)
MONOCYTES NFR BLD: 10.5 % (ref 4–15)
MONOCYTES NFR BLD: 10.5 % (ref 4–15)
NEUTROPHILS # BLD AUTO: 8.7 K/UL (ref 1.8–7.7)
NEUTROPHILS # BLD AUTO: 8.7 K/UL (ref 1.8–7.7)
NEUTROPHILS NFR BLD: 79.6 % (ref 38–73)
NEUTROPHILS NFR BLD: 79.6 % (ref 38–73)
NRBC BLD-RTO: 0 /100 WBC
NRBC BLD-RTO: 0 /100 WBC
PLATELET # BLD AUTO: 242 K/UL (ref 150–350)
PLATELET # BLD AUTO: 242 K/UL (ref 150–350)
PMV BLD AUTO: 10.7 FL (ref 9.2–12.9)
PMV BLD AUTO: 10.7 FL (ref 9.2–12.9)
POTASSIUM SERPL-SCNC: 3.9 MMOL/L (ref 3.5–5.1)
RBC # BLD AUTO: 4.69 M/UL (ref 4.6–6.2)
RBC # BLD AUTO: 4.69 M/UL (ref 4.6–6.2)
SODIUM SERPL-SCNC: 135 MMOL/L (ref 136–145)
TROPONIN I SERPL DL<=0.01 NG/ML-MCNC: >50 NG/ML (ref 0–0.03)
WBC # BLD AUTO: 10.88 K/UL (ref 3.9–12.7)
WBC # BLD AUTO: 10.88 K/UL (ref 3.9–12.7)

## 2019-11-14 PROCEDURE — 80048 BASIC METABOLIC PNL TOTAL CA: CPT

## 2019-11-14 PROCEDURE — 25000003 PHARM REV CODE 250: Performed by: INTERNAL MEDICINE

## 2019-11-14 PROCEDURE — 25000003 PHARM REV CODE 250: Performed by: NURSE PRACTITIONER

## 2019-11-14 PROCEDURE — 99232 SBSQ HOSP IP/OBS MODERATE 35: CPT | Mod: ,,, | Performed by: INTERNAL MEDICINE

## 2019-11-14 PROCEDURE — 63600175 PHARM REV CODE 636 W HCPCS: Mod: JG | Performed by: INTERNAL MEDICINE

## 2019-11-14 PROCEDURE — 93010 EKG 12-LEAD: ICD-10-PCS | Mod: ,,, | Performed by: INTERNAL MEDICINE

## 2019-11-14 PROCEDURE — 63600175 PHARM REV CODE 636 W HCPCS: Performed by: NURSE PRACTITIONER

## 2019-11-14 PROCEDURE — 83036 HEMOGLOBIN GLYCOSYLATED A1C: CPT

## 2019-11-14 PROCEDURE — 85025 COMPLETE CBC W/AUTO DIFF WBC: CPT

## 2019-11-14 PROCEDURE — 99232 PR SUBSEQUENT HOSPITAL CARE,LEVL II: ICD-10-PCS | Mod: ,,, | Performed by: INTERNAL MEDICINE

## 2019-11-14 PROCEDURE — 93010 ELECTROCARDIOGRAM REPORT: CPT | Mod: ,,, | Performed by: INTERNAL MEDICINE

## 2019-11-14 PROCEDURE — 36415 COLL VENOUS BLD VENIPUNCTURE: CPT

## 2019-11-14 PROCEDURE — 21400001 HC TELEMETRY ROOM

## 2019-11-14 PROCEDURE — 93005 ELECTROCARDIOGRAM TRACING: CPT

## 2019-11-14 PROCEDURE — 84484 ASSAY OF TROPONIN QUANT: CPT

## 2019-11-14 RX ORDER — ENOXAPARIN SODIUM 100 MG/ML
40 INJECTION SUBCUTANEOUS EVERY 24 HOURS
Status: DISCONTINUED | OUTPATIENT
Start: 2019-11-14 | End: 2019-11-15 | Stop reason: HOSPADM

## 2019-11-14 RX ORDER — BISACODYL 10 MG
10 SUPPOSITORY, RECTAL RECTAL DAILY PRN
Status: DISCONTINUED | OUTPATIENT
Start: 2019-11-14 | End: 2019-11-15 | Stop reason: HOSPADM

## 2019-11-14 RX ORDER — DOCUSATE SODIUM 100 MG/1
100 CAPSULE, LIQUID FILLED ORAL DAILY
Status: DISCONTINUED | OUTPATIENT
Start: 2019-11-14 | End: 2019-11-15 | Stop reason: HOSPADM

## 2019-11-14 RX ADMIN — CARVEDILOL 6.25 MG: 6.25 TABLET, FILM COATED ORAL at 09:11

## 2019-11-14 RX ADMIN — ACETAMINOPHEN 1000 MG: 500 TABLET ORAL at 09:11

## 2019-11-14 RX ADMIN — TICAGRELOR 90 MG: 90 TABLET ORAL at 12:11

## 2019-11-14 RX ADMIN — ACETAMINOPHEN 1000 MG: 500 TABLET ORAL at 01:11

## 2019-11-14 RX ADMIN — ASPIRIN 81 MG: 81 TABLET, COATED ORAL at 09:11

## 2019-11-14 RX ADMIN — TICAGRELOR 90 MG: 90 TABLET ORAL at 09:11

## 2019-11-14 RX ADMIN — TIROFIBAN 0.15 MCG/KG/MIN: 5 INJECTION, SOLUTION INTRAVENOUS at 01:11

## 2019-11-14 RX ADMIN — ATORVASTATIN CALCIUM 80 MG: 40 TABLET, FILM COATED ORAL at 09:11

## 2019-11-14 RX ADMIN — PANTOPRAZOLE SODIUM 40 MG: 40 TABLET, DELAYED RELEASE ORAL at 09:11

## 2019-11-14 RX ADMIN — ENOXAPARIN SODIUM 40 MG: 100 INJECTION SUBCUTANEOUS at 05:11

## 2019-11-14 RX ADMIN — LOSARTAN POTASSIUM 25 MG: 25 TABLET ORAL at 09:11

## 2019-11-14 NOTE — PROGRESS NOTES
Pt arrived to ICU and secured to monitor. Pt alert. VSS. Right groin with oozing to bandage noted. New dressing applied and pressure held. Orders noted. Pt instructed to keep right leg straight and HOB flat. Family at side. Update given. Call light in reach.

## 2019-11-14 NOTE — HPI
The pt is a 58 yo man , resident of [place] who works here in [place], [place] drove himself to the Emergency room for evaluation of chest pain that started while he was driving down the road 30 min prior to the arrival to the ER. His past medical history is significant for CABG x 5 vessels in 2013 and PCI post CABG in Jan'2019 and  , Hyperlipidemia. Onset of chest pain was spontaneous and persistent since onset . Pt described pain as pressure sensation in the mid chest  Radiated  to both side of the jaw. Denies associated SOB, diaphoresis , lightheadedness or dizziness . Denies cough , palpitations , nausea or vomiting, tingling or numbness to the extremities. Initial EKG at 1259  revealed SR , PVCs, non specific ST/T wave change and prolonged QT. Cardiac troponin was < 0.006.  At 1300 pt was given  mg and started on NTG s/l, NTG paste , Heparin bolus followed by ggt and Lopressor injection and was evaluated by Cardiology.    At 1342 while pt was boarding in ER , he became unresponsive and went into pulseless VF . CPR was initiated and pt had received epi X 1 and defibrillation x1 with ROSC. Total code time was 5 min. EKG at 1355 showed ST elevation involving inferior and lateral leads consistent with Acute STEMI  and cardiac troponin bumped to  18.5. Pt underwent emergent LHC revealed occluded RCA, occluded SVG to OM and D1, Occluded LAD and LCX, Patent LIMA to LAD. Pt was treated with stenting and thrombectomy and  subsequently transported to ICU for further management.

## 2019-11-14 NOTE — CONSULTS
[facility]   Critical Care Medicine  Consult Note    Patient Name: Deondre Monson  MRN: [id number]  Admission Date: 11/13/2019  Hospital Length of Stay: 0 days  Code Status: No Order  Attending Physician: No att. providers found   Primary Care Provider: No primary care provider on file.   Principal Problem: Cardiac arrest      Subjective:     HPI:  59 year old male with PMH including CAD post CABG x5 in 2013 and PCI post CABG (last 1/2019)  Presented to ED with acute bilateral jaw pain while in [place] for work (pt lives and receives medical care in [place], [place])  ED evaluation consistent with ACS  IV lopressor, heparin infusion,ASA,  NTG SL, and NTP started  While in ED initiating plan of care pt had v fib arrest with CPR, shock x 1, epi x 1 - total code time 5min before ROSC  Taken emergently to [facility] post arrest  LHC revealed   OCCLUDED SVG TO OM TREATED WITH STENTING AND THROMBECTOMY   SVG TO PDA 90% INSTENT TREATED WITH ORSIRO STENT POST PTCA WITH 3.5 AT 24 KENDALL.  LAD OCCLUDED LCX OCCLUDED   RCA OCCLUDED.   SVG TO D1 OCCLUDED   LIMA TO LAD PATENT   EF 40%    Hospital/ICU Course:  Admitted to ICU post C with rt groin dressing having some oozing, required epi/lidocaine injection and femstop by cardiology  He is AAOx3 this evening with spouse at bedside; denies any jaw pain, SOB, nausea; endorses reproducible musculoskeletal type sternal pain    Past Medical History:   Diagnosis Date    Cardiac arrest 11/13/2019       No past surgical history on file.    Review of patient's allergies indicates:  No Known Allergies    Family History     None        Tobacco Use    Smoking status: Not on file   Substance and Sexual Activity    Alcohol use: Not on file    Drug use: Not on file    Sexual activity: Not on file         Review of Systems   Constitutional: Negative for activity change, diaphoresis, fatigue and fever.   HENT: Negative.    Respiratory: Negative for cough, choking and shortness of  breath.    Cardiovascular: Positive for chest pain (reproducible sternal pain). Negative for palpitations and leg swelling.   Gastrointestinal: Negative for constipation, diarrhea and nausea.   Musculoskeletal:        Musculoskeletal chest pain   Skin: Negative for color change and rash.   Psychiatric/Behavioral: The patient is not nervous/anxious.      Objective:     Vital Signs (Most Recent):  Temp: 98 °F (36.7 °C) (11/13/19 1630)  Pulse: 74 (11/13/19 1630)  Resp: 17 (11/13/19 1630)  BP: (!) 157/103 (11/13/19 1630)  SpO2: 100 % (11/13/19 1630) Vital Signs (24h Range):  Temp:  [98 °F (36.7 °C)] 98 °F (36.7 °C)  Pulse:  [0-178] 74  Resp:  [] 17  SpO2:  [84 %-100 %] 100 %  BP: ()/() 157/103     Weight: 93.1 kg (205 lb 3.2 oz)  Body mass index is 27.07 kg/m².    No intake or output data in the 24 hours ending 11/13/19 1941    Physical Exam   Constitutional: He is oriented to person, place, and time. He appears well-nourished. No distress.   HENT:   Head: Atraumatic.   Eyes: Pupils are equal, round, and reactive to light. Conjunctivae are normal.   Neck: No JVD present. No tracheal deviation present.   Cardiovascular: Normal rate and regular rhythm.   Pulses:       Radial pulses are 2+ on the right side, and 2+ on the left side.        Dorsalis pedis pulses are 2+ on the right side, and 2+ on the left side.   Pulmonary/Chest: Effort normal and breath sounds normal.   Abdominal: Soft. Bowel sounds are normal. He exhibits no distension.   Musculoskeletal: He exhibits no edema.   Neurological: He is alert and oriented to person, place, and time.   Skin: Skin is warm and dry. Capillary refill takes less than 2 seconds.        Psychiatric: He has a normal mood and affect. His behavior is normal. Judgment and thought content normal.       Vents:       Lines/Drains/Airways     Peripheral Intravenous Line                 Peripheral IV - Single Lumen 11/13/19 1320 20 G Right Hand less than 1 day                 Significant Labs:    CBC/Anemia Profile:  Recent Labs   Lab 11/13/19  1611   WBC 14.31*   HGB 13.6*   HCT 40.8      MCV 89   RDW 12.3        Chemistries:  Recent Labs   Lab 11/13/19  1314      K 4.4      CO2 22*   BUN 15   CREATININE 1.0   CALCIUM 9.8   ALBUMIN 4.6   PROT 7.7   BILITOT 1.2*   ALKPHOS 64   ALT 25   AST 43*       All pertinent labs within the past 24 hours have been reviewed.    Significant Imaging:   I have reviewed all pertinent imaging results/findings within the past 24 hours.      ABG  No results for input(s): PH, PO2, PCO2, HCO3, BE in the last 168 hours.  Assessment/Plan:     Cardiac/Vascular  * Cardiac arrest  Neuro intact post arrest  Emergent LHC, CAD involving grafts treated with PTCA  ICU hemodynamic monitoring  Post CPR sternal / musculoskeletal pain, trial tylenol, if continued trial lidoderm patch  Mobilize in AM    Coronary artery disease involving coronary bypass graft of native heart with unstable angina pectoris  Post PTCA  On aggrastat infusion per cardiology  ASA, statin, coreg per cardiology  ICU hemodynamic monitoring        Critical Care Daily Checklist:    A: Awake: RASS Goal/Actual Goal:    Actual:     B: Spontaneous Breathing Trial Performed?     C: SAT & SBT Coordinated?  n/a                      D: Delirium: CAM-ICU     E: Early Mobility Performed? Yes   F: Feeding Goal:    Status:     Current Diet Order   Procedures    Diet Cardiac      AS: Analgesia/Sedation Tylenol, lidoderm   T: Thromboembolic Prophylaxis Aggrastat, SCD   H: HOB > 300 Yes   U: Stress Ulcer Prophylaxis (if needed) PPI   G: Glucose Control monitor   B: Bowel Function     I: Indwelling Catheter (Lines & Barnes) Necessity reviewed   D: De-escalation of Antimicrobials/Pharmacotherapies reviewed    Plan for the day/ETD ICU hemodynamic monitoring    Code Status:  Family/Goals of Care: Full  Home on discharge   I have discussed case and plan of care in detail with Dr Ruelas; Status and  plan of care were discussed with team on multidisciplinary rounds.    Thank you for your consult. We will follow and assist while in ICU and sign off on transfer out if no pulmonary issues arise.     KASEY Welch-BC  Critical Care Medicine  [facility]

## 2019-11-14 NOTE — NURSING
Patient lying in bed talking with family with no distress noted. Complain of chest hurting when coughing.

## 2019-11-14 NOTE — HOSPITAL COURSE
Admitted to ICU post LHC with rt groin dressing having some oozing, required epi/lidocaine injection and femstop by cardiology  He is AAOx3 this evening with spouse at bedside; denies any jaw pain, SOB, nausea; endorses reproducible musculoskeletal type sternal pain

## 2019-11-14 NOTE — HPI
59 year old male with PMH including CAD post CABG x5 in 2013 and PCI post CABG (last 1/2019)  Presented to ED with acute bilateral jaw pain while in [place] for work (pt lives and receives medical care in [place], [place])  ED evaluation consistent with ACS  IV lopressor, heparin infusion,ASA,  NTG SL, and NTP started  While in ED initiating plan of care pt had v fib arrest with CPR, shock x 1, epi x 1 - total code time 5min before ROSC  Taken emergently to [facility] post arrest  LHC revealed   OCCLUDED SVG TO OM TREATED WITH STENTING AND THROMBECTOMY   SVG TO PDA 90% INSTENT TREATED WITH Flourish Prenatal STENT POST PTCA WITH 3.5 AT 24 KENDALL.  LAD OCCLUDED LCX OCCLUDED   RCA OCCLUDED.   SVG TO D1 OCCLUDED   LIMA TO LAD PATENT   EF 40%

## 2019-11-14 NOTE — ASSESSMENT & PLAN NOTE
Labs pending  Patient complains of ongoing mid-sternal chest pain with radiation to bilateral jaw  EKG with ST-T Wave abnormality, ST depression noted on EKG today  ECHO completed, pending  Start IV heparin gtt for ACS today  IV Lopressor x 2 doses  NTG SL followed by NTP 1 inch to chest wall today  Repeat EKG pending  Plan for Urgent LHC today per Dr. Castro  Request old Cath report from [facility], Dr. Shaan Bey\  Further recs to follow LHC today.     After Evaluation in ED, patient had episode of VFIB requiring defibrillation.  Patient will be brought to Cath Lab for Urgent LH per Dr. Castro.   Dr. Castro spoke to Dr. Bey regarding patient's last LHC results.     11/14  -s/p PCI per Dr. Castro yesterday  -Continue ASA, Statin, BB, ARB, Brilinta  -OK to transfer to telemetry  -FLP pending  -reassess in AM

## 2019-11-14 NOTE — HOSPITAL COURSE
11/14/19--Patient seen and examined in room, lying in bed. No chest pain or SOB today. Feels good today on exam. Labs reviewed, stable.     11/15/19--patient seen and examined in room, lying in bed. Feels great today. Has some chest soreness post CPR. Labs reviewed, Troponin trending downward at 17.147 today, K+ 4.5, Cr 0.8, Mag 1.9.

## 2019-11-14 NOTE — SUBJECTIVE & OBJECTIVE
Interval History: no acute issues noted o/n. No chest pain or SOB today on exam.     Review of Systems   Constitution: Positive for malaise/fatigue.   HENT: Negative for hearing loss and hoarse voice.    Eyes: Negative for blurred vision and visual disturbance.   Cardiovascular: Positive for chest pain (radiation to bilateral jaw). Negative for claudication, dyspnea on exertion, irregular heartbeat, leg swelling, near-syncope, orthopnea, palpitations, paroxysmal nocturnal dyspnea and syncope.   Respiratory: Negative for cough, hemoptysis, shortness of breath, sleep disturbances due to breathing, snoring and wheezing.    Endocrine: Negative for cold intolerance and heat intolerance.   Hematologic/Lymphatic: Bruises/bleeds easily.   Skin: Negative for color change, dry skin and nail changes.   Musculoskeletal: Positive for arthritis. Negative for back pain, joint pain and myalgias.   Gastrointestinal: Negative for bloating, abdominal pain, constipation, nausea and vomiting.   Genitourinary: Negative for dysuria, flank pain, hematuria and hesitancy.   Neurological: Negative for headaches, light-headedness, loss of balance, numbness, paresthesias and weakness.   Psychiatric/Behavioral: Negative for altered mental status.   Allergic/Immunologic: Negative for environmental allergies.     Objective:     Vital Signs (Most Recent):  Temp: 98 °F (36.7 °C) (11/14/19 1318)  Pulse: 78 (11/14/19 1318)  Resp: 18 (11/14/19 1318)  BP: 121/66 (11/14/19 1318)  SpO2: 95 % (11/14/19 1318) Vital Signs (24h Range):  Temp:  [97.1 °F (36.2 °C)-98 °F (36.7 °C)] 98 °F (36.7 °C)  Pulse:  [47-79] 78  Resp:  [10-20] 18  SpO2:  [95 %-100 %] 95 %  BP: (111-157)/() 121/66     Weight: 93.1 kg (205 lb 3.2 oz)  Body mass index is 27.07 kg/m².     SpO2: 95 %  O2 Device (Oxygen Therapy): room air      Intake/Output Summary (Last 24 hours) at 11/14/2019 1439  Last data filed at 11/14/2019 0900  Gross per 24 hour   Intake 1606.94 ml   Output 750 ml    Net 856.94 ml       Lines/Drains/Airways     Peripheral Intravenous Line                 Peripheral IV - Single Lumen 11/13/19 1320 20 G Right Hand 1 day                Physical Exam   Constitutional: He is oriented to person, place, and time. He appears well-developed and well-nourished. No distress.   HENT:   Head: Normocephalic and atraumatic.   Eyes: Pupils are equal, round, and reactive to light.   Neck: Normal range of motion and full passive range of motion without pain. Neck supple. No JVD present.   Cardiovascular: Normal rate, S1 normal, S2 normal and intact distal pulses. An irregular rhythm present. Frequent extrasystoles are present. PMI is not displaced. Exam reveals no distant heart sounds.   No murmur heard.  Pulses:       Radial pulses are 2+ on the right side, and 2+ on the left side.        Dorsalis pedis pulses are 2+ on the right side, and 2+ on the left side.   Right groin femoral access C/D/I, no hematoma or ecchymosis noted.    Pulmonary/Chest: Effort normal and breath sounds normal. No accessory muscle usage. No respiratory distress. He has no decreased breath sounds. He has no wheezes. He has no rales.   Abdominal: Soft. Bowel sounds are normal. He exhibits no distension. There is no tenderness.   Musculoskeletal: Normal range of motion. He exhibits no edema.        Right ankle: He exhibits no swelling.        Left ankle: He exhibits no swelling.   Neurological: He is alert and oriented to person, place, and time.   Skin: Skin is warm and dry. He is not diaphoretic. No cyanosis. Nails show no clubbing.   Psychiatric: He has a normal mood and affect. His speech is normal and behavior is normal. Judgment and thought content normal. Cognition and memory are normal.   Nursing note and vitals reviewed.      Significant Labs:   BMP:   Recent Labs   Lab 11/13/19  1314 11/14/19  0311   * 148*    135*   K 4.4 3.9    104   CO2 22* 22*   BUN 15 12   CREATININE 1.0 0.9   CALCIUM  9.8 8.7   , CBC   Recent Labs   Lab 11/13/19  1611 11/14/19  0311   WBC 14.31* 10.88  10.88   HGB 13.6* 14.2  14.2   HCT 40.8 43.1  43.1    242  242   , Troponin   Recent Labs   Lab 11/13/19  1314 11/13/19  1611 11/14/19  0311   TROPONINI <0.006 18.500* >50.000*    and All pertinent lab results from the last 24 hours have been reviewed.    Significant Imaging: Echocardiogram:   2D echo with color flow doppler:   Results for orders placed or performed during the hospital encounter of 11/13/19   2D echo with color flow doppler   Result Value Ref Range    QEF 50 55 - 65    Diastolic Dysfunction No     Est. PA Systolic Pressure 26.83     Tricuspid Valve Regurgitation TRIVIAL     Narrative    Date of Procedure: 11/13/2019        TEST DESCRIPTION   Technical Quality: This is a portable study performed at the patient's bedside. This is a technically challenging study. There is poor endocardial definition.     Aorta: The aortic root is normal in size, measuring 3.1 cm at sinotubular junction and 3.3 cm at Sinuses of Valsalva. The proximal ascending aorta is normal in size, measuring 2.9 cm across.     Left Atrium: The left atrial volume index is mildly enlarged, measuring 36.55 cc/m2.     Left Ventricle: The left ventricle is normal in size, with an end-diastolic diameter of 5.6 cm, and an end-systolic diameter of 4.1 cm. LV wall thickness is normal, with the septum measuring 1.2 cm and the posterior wall measuring 1.3 cm across. Relative   wall thickness was increased at 0.46, and the LV mass index was increased at 164.3 g/m2 consistent with concentric left ventricular hypertrophy. The following segments were moderately hypokinetic: apical septum, mid inferoseptum, apical inferior wall,   mid inferior wall.  Left ventricular systolic function appears low normal to mildly depressed. Visually estimated ejection fraction is 50-55%.     Diastolic indices: Diastolic function is normal.     Right Atrium: The right  atrium is normal in size, measuring 5.0 cm in length and 4.5 cm in width in the apical view.     Right Ventricle: The right ventricle is normal in size. Global right ventricular systolic function appears normal. The estimated PA systolic pressure is greater than 27 mmHg.     Aortic Valve:  Aortic valve is normal in structure with normal leaflet mobility.     Mitral Valve:  Mitral valve is normal in structure with normal leaflet mobility.     Tricuspid Valve:  Tricuspid valve is normal in structure with normal leaflet mobility. There is trivial tricuspid regurgitation.     Pulmonary Valve:  Pulmonary valve is normal in structure with normal leaflet mobility. There is trivial pulmonic regurgitation.     IVC: The IVC is not visualized.     Atrial Septum: The atrial septum is intact.     Intracavitary: There is no evidence of pericardial effusion, intracavity mass, thrombi, or vegetation.         CONCLUSIONS     1 - Mild left atrial enlargement.     2 - Concentric hypertrophy.     3 - Wall motion abnormalities.     4 - Low normal to mildly depressed left ventricular systolic function (EF 50-55%).     5 - Normal left ventricular diastolic function.     6 - Normal right ventricular systolic function .     7 - The estimated PA systolic pressure is greater than 27 mmHg.     8 - Trivial tricuspid regurgitation.     9 - Trivial pulmonic regurgitation.             This document has been electronically    SIGNED BY: Masha Castro MD On: 11/13/2019 20:36    and X-Ray: CXR: X-Ray Chest 1 View (CXR): No results found for this visit on 11/13/19.

## 2019-11-14 NOTE — SUBJECTIVE & OBJECTIVE
Past Medical History:   Diagnosis Date    Cardiac arrest 11/13/2019       No past surgical history on file.    Review of patient's allergies indicates:  No Known Allergies    No current facility-administered medications on file prior to encounter.      No current outpatient medications on file prior to encounter.     Family History     None        Tobacco Use    Smoking status: Not on file   Substance and Sexual Activity    Alcohol use: Not on file    Drug use: Not on file    Sexual activity: Not on file     Review of Systems   Constitutional: Negative for activity change, appetite change and fever.   HENT: Negative for sore throat.         Jaw pain   Eyes: Negative for visual disturbance.   Respiratory: Negative for cough, chest tightness and shortness of breath.    Cardiovascular: Positive for chest pain. Negative for palpitations and leg swelling.   Gastrointestinal: Negative for abdominal distention, abdominal pain, constipation, diarrhea, nausea and vomiting.   Endocrine: Negative for polyuria.   Genitourinary: Negative for decreased urine volume, dysuria, flank pain, frequency and hematuria.   Musculoskeletal: Negative for back pain and gait problem.   Skin: Negative for rash.   Neurological: Negative for syncope, speech difficulty, weakness, light-headedness and headaches.   Psychiatric/Behavioral: Negative for confusion, hallucinations and sleep disturbance.     Objective:     Vital Signs (Most Recent):  Temp: 98 °F (36.7 °C) (11/13/19 1630)  Pulse: 74 (11/13/19 1630)  Resp: 17 (11/13/19 1630)  BP: (!) 157/103 (11/13/19 1630)  SpO2: 100 % (11/13/19 1630) Vital Signs (24h Range):  Temp:  [98 °F (36.7 °C)] 98 °F (36.7 °C)  Pulse:  [0-178] 74  Resp:  [] 17  SpO2:  [84 %-100 %] 100 %  BP: ()/() 157/103     Weight: 93.1 kg (205 lb 3.2 oz)  Body mass index is 27.07 kg/m².    Physical Exam   Constitutional: He is oriented to person, place, and time. He appears well-developed and  well-nourished. No distress.   HENT:   Head: Normocephalic and atraumatic.   Mouth/Throat: No oropharyngeal exudate.   Eyes: Pupils are equal, round, and reactive to light. Conjunctivae and EOM are normal.   Neck: Normal range of motion. Neck supple. No JVD present. No thyromegaly present.   Cardiovascular: Normal rate, regular rhythm and normal heart sounds.   No murmur heard.  Pulmonary/Chest: Effort normal and breath sounds normal. No respiratory distress. He has no wheezes. He has no rales. He exhibits no tenderness.   Abdominal: Soft. Bowel sounds are normal. He exhibits no distension. There is no tenderness. There is no rebound and no guarding.   Musculoskeletal: Normal range of motion. He exhibits no edema.   Lymphadenopathy:     He has no cervical adenopathy.   Neurological: He is alert and oriented to person, place, and time. He displays normal reflexes. No cranial nerve deficit or sensory deficit.   Skin: Skin is warm and dry. No rash noted. He is not diaphoretic.   Psychiatric: He has a normal mood and affect.         CRANIAL NERVES     CN III, IV, VI   Pupils are equal, round, and reactive to light.  Extraocular motions are normal.        Significant Labs:   BMP:   Recent Labs   Lab 11/13/19  1314   *      K 4.4      CO2 22*   BUN 15   CREATININE 1.0   CALCIUM 9.8     CBC:   Recent Labs   Lab 11/13/19  1611   WBC 14.31*   HGB 13.6*   HCT 40.8        CMP:   Recent Labs   Lab 11/13/19  1314      K 4.4      CO2 22*   *   BUN 15   CREATININE 1.0   CALCIUM 9.8   PROT 7.7   ALBUMIN 4.6   BILITOT 1.2*   ALKPHOS 64   AST 43*   ALT 25   ANIONGAP 13   EGFRNONAA >60     Cardiac Markers:   Recent Labs   Lab 11/13/19  1611   BNP 25       Significant Imaging:

## 2019-11-14 NOTE — PROGRESS NOTES
I have reviewed the previous nurse's assessment and based on my own physical assessment I agree with her charting. Patient in no acute distress at time. Family at bedside. Patient instructed to call for assistance.

## 2019-11-14 NOTE — ASSESSMENT & PLAN NOTE
Post PTCA  On aggrastat infusion per cardiology  ASA, statin, coreg per cardiology  ICU hemodynamic monitoring

## 2019-11-14 NOTE — ASSESSMENT & PLAN NOTE
- Pt underwent emergent PCI - ORSIRO stent x2 - SVG to OM , SVG to PDA  -ASA 81 mg , Ticagrelor 90 mg BID  -BB -carvedilol 6.25 mg BID  -ARB losartan 25 mg daily   -High intensity statin Atorvastatin 80 mg daily   -Heparin ggt x 48h  -NTG  -GPIIb/IIa inhibitor

## 2019-11-14 NOTE — SUBJECTIVE & OBJECTIVE
Past Medical History:   Diagnosis Date    Cardiac arrest 11/13/2019       No past surgical history on file.    Review of patient's allergies indicates:  No Known Allergies    Family History     None        Tobacco Use    Smoking status: Not on file   Substance and Sexual Activity    Alcohol use: Not on file    Drug use: Not on file    Sexual activity: Not on file         Review of Systems   Constitutional: Negative for activity change, diaphoresis, fatigue and fever.   HENT: Negative.    Respiratory: Negative for cough, choking and shortness of breath.    Cardiovascular: Positive for chest pain (reproducible sternal pain). Negative for palpitations and leg swelling.   Gastrointestinal: Negative for constipation, diarrhea and nausea.   Musculoskeletal:        Musculoskeletal chest pain   Skin: Negative for color change and rash.   Psychiatric/Behavioral: The patient is not nervous/anxious.      Objective:     Vital Signs (Most Recent):  Temp: 98 °F (36.7 °C) (11/13/19 1630)  Pulse: 74 (11/13/19 1630)  Resp: 17 (11/13/19 1630)  BP: (!) 157/103 (11/13/19 1630)  SpO2: 100 % (11/13/19 1630) Vital Signs (24h Range):  Temp:  [98 °F (36.7 °C)] 98 °F (36.7 °C)  Pulse:  [0-178] 74  Resp:  [] 17  SpO2:  [84 %-100 %] 100 %  BP: ()/() 157/103     Weight: 93.1 kg (205 lb 3.2 oz)  Body mass index is 27.07 kg/m².    No intake or output data in the 24 hours ending 11/13/19 1941    Physical Exam   Constitutional: He is oriented to person, place, and time. He appears well-nourished. No distress.   HENT:   Head: Atraumatic.   Eyes: Pupils are equal, round, and reactive to light. Conjunctivae are normal.   Neck: No JVD present. No tracheal deviation present.   Cardiovascular: Normal rate and regular rhythm.   Pulses:       Radial pulses are 2+ on the right side, and 2+ on the left side.        Dorsalis pedis pulses are 2+ on the right side, and 2+ on the left side.   Pulmonary/Chest: Effort normal and breath  sounds normal.   Abdominal: Soft. Bowel sounds are normal. He exhibits no distension.   Musculoskeletal: He exhibits no edema.   Neurological: He is alert and oriented to person, place, and time.   Skin: Skin is warm and dry. Capillary refill takes less than 2 seconds.        Psychiatric: He has a normal mood and affect. His behavior is normal. Judgment and thought content normal.       Vents:       Lines/Drains/Airways     Peripheral Intravenous Line                 Peripheral IV - Single Lumen 11/13/19 1320 20 G Right Hand less than 1 day                Significant Labs:    CBC/Anemia Profile:  Recent Labs   Lab 11/13/19  1611   WBC 14.31*   HGB 13.6*   HCT 40.8      MCV 89   RDW 12.3        Chemistries:  Recent Labs   Lab 11/13/19  1314      K 4.4      CO2 22*   BUN 15   CREATININE 1.0   CALCIUM 9.8   ALBUMIN 4.6   PROT 7.7   BILITOT 1.2*   ALKPHOS 64   ALT 25   AST 43*       All pertinent labs within the past 24 hours have been reviewed.    Significant Imaging:   I have reviewed all pertinent imaging results/findings within the past 24 hours.

## 2019-11-14 NOTE — NURSING TRANSFER
Nursing Transfer Note      11/14/2019     Transfer To: 225    Transfer via wheelchair    Transfer with cardiac monitoring    Transported by MIAN Beckman    Medicines sent: None    Chart send with patient: Yes    Notified: Family    Patient reassessed at: 11/14/19 @ 0900hrs    Upon arrival to floor: cardiac monitor applied, patient oriented to room, call bell in reach and bed in lowest position

## 2019-11-14 NOTE — H&P
[facility] Hospital Medicine  History & Physical    Patient Name: Deondre Monson  MRN: [id number]  Admission Date: 11/13/2019  Attending Physician: Madhavi Martin MD  Primary Care Provider: No primary care provider on file.         Patient information was obtained from Patient.     Subjective:     Principal Problem:Acute coronary syndrome    Chief Complaint:   Chief Complaint   Patient presents with    Chest Pain        HPI: The pt is a 58 yo man , resident of [place] who works here in [place], [place] drove himself to the Emergency room for evaluation of chest pain that started while he was driving down the road 30 min prior to the arrival to the ER. His past medical history is significant for CABG x 5 vessels in 2013 and PCI post CABG in Jan'2019 and  , Hyperlipidemia. Onset of chest pain was spontaneous and persistent since onset . Pt described pain as pressure sensation in the mid chest  Radiated  to both side of the jaw. Denies associated SOB, diaphoresis , lightheadedness or dizziness . Denies cough , palpitations , nausea or vomiting, tingling or numbness to the extremities. Initial EKG at 1259  revealed SR , PVCs, non specific ST/T wave change and prolonged QT. Cardiac troponin was < 0.006.  At 1300 pt was given  mg and started on NTG s/l, NTG paste , Heparin bolus followed by ggt and Lopressor injection and was evaluated by Cardiology.    At 1342 while pt was boarding in ER , he became unresponsive and went into pulseless VF . CPR was initiated and pt had received epi X 1 and defibrillation x1 with ROSC. Total code time was 5 min. EKG at 1355 showed ST elevation involving inferior and lateral leads consistent with Acute STEMI  and cardiac troponin bumped to  18.5. Pt underwent emergent LHC revealed occluded RCA, occluded SVG to OM and D1, Occluded LAD and LCX, Patent LIMA to LAD. Pt was treated with stenting and thrombectomy and  subsequently transported to ICU for further  management.         Past Medical History:   Diagnosis Date    Cardiac arrest 11/13/2019       No past surgical history on file.    Review of patient's allergies indicates:  No Known Allergies    No current facility-administered medications on file prior to encounter.      No current outpatient medications on file prior to encounter.     Family History     None        Tobacco Use    Smoking status: Not on file   Substance and Sexual Activity    Alcohol use: Not on file    Drug use: Not on file    Sexual activity: Not on file     Review of Systems   Constitutional: Negative for activity change, appetite change and fever.   HENT: Negative for sore throat.         Jaw pain   Eyes: Negative for visual disturbance.   Respiratory: Negative for cough, chest tightness and shortness of breath.    Cardiovascular: Positive for chest pain. Negative for palpitations and leg swelling.   Gastrointestinal: Negative for abdominal distention, abdominal pain, constipation, diarrhea, nausea and vomiting.   Endocrine: Negative for polyuria.   Genitourinary: Negative for decreased urine volume, dysuria, flank pain, frequency and hematuria.   Musculoskeletal: Negative for back pain and gait problem.   Skin: Negative for rash.   Neurological: Negative for syncope, speech difficulty, weakness, light-headedness and headaches.   Psychiatric/Behavioral: Negative for confusion, hallucinations and sleep disturbance.     Objective:     Vital Signs (Most Recent):  Temp: 98 °F (36.7 °C) (11/13/19 1630)  Pulse: 74 (11/13/19 1630)  Resp: 17 (11/13/19 1630)  BP: (!) 157/103 (11/13/19 1630)  SpO2: 100 % (11/13/19 1630) Vital Signs (24h Range):  Temp:  [98 °F (36.7 °C)] 98 °F (36.7 °C)  Pulse:  [0-178] 74  Resp:  [] 17  SpO2:  [84 %-100 %] 100 %  BP: ()/() 157/103     Weight: 93.1 kg (205 lb 3.2 oz)  Body mass index is 27.07 kg/m².    Physical Exam   Constitutional: He is oriented to person, place, and time. He appears  well-developed and well-nourished. No distress.   HENT:   Head: Normocephalic and atraumatic.   Mouth/Throat: No oropharyngeal exudate.   Eyes: Pupils are equal, round, and reactive to light. Conjunctivae and EOM are normal.   Neck: Normal range of motion. Neck supple. No JVD present. No thyromegaly present.   Cardiovascular: Normal rate, regular rhythm and normal heart sounds.   No murmur heard.  Pulmonary/Chest: Effort normal and breath sounds normal. No respiratory distress. He has no wheezes. He has no rales. He exhibits no tenderness.   Abdominal: Soft. Bowel sounds are normal. He exhibits no distension. There is no tenderness. There is no rebound and no guarding.   Musculoskeletal: Normal range of motion. He exhibits no edema.   Lymphadenopathy:     He has no cervical adenopathy.   Neurological: He is alert and oriented to person, place, and time. He displays normal reflexes. No cranial nerve deficit or sensory deficit.   Skin: Skin is warm and dry. No rash noted. He is not diaphoretic.   Psychiatric: He has a normal mood and affect.         CRANIAL NERVES     CN III, IV, VI   Pupils are equal, round, and reactive to light.  Extraocular motions are normal.        Significant Labs:   BMP:   Recent Labs   Lab 11/13/19  1314   *      K 4.4      CO2 22*   BUN 15   CREATININE 1.0   CALCIUM 9.8     CBC:   Recent Labs   Lab 11/13/19  1611   WBC 14.31*   HGB 13.6*   HCT 40.8        CMP:   Recent Labs   Lab 11/13/19  1314      K 4.4      CO2 22*   *   BUN 15   CREATININE 1.0   CALCIUM 9.8   PROT 7.7   ALBUMIN 4.6   BILITOT 1.2*   ALKPHOS 64   AST 43*   ALT 25   ANIONGAP 13   EGFRNONAA >60     Cardiac Markers:   Recent Labs   Lab 11/13/19  1611   BNP 25       Significant Imaging:     Assessment/Plan:     * Acute coronary syndrome/ STEMI  - Pt underwent emergent PCI - ORSIRO stent x2 - SVG to OM , SVG to PDA  -ASA 81 mg , Ticagrelor 90 mg BID  -BB -carvedilol 6.25 mg BID  -ARB  losartan 25 mg daily   -High intensity statin Atorvastatin 80 mg daily   -Heparin ggt x 48h  -NTG  -GPIIb/IIa inhibitor       Cardiac arrest  - due to ACS/STEMI  -Pt was successfully resuscitated and underwent primary PCI      Coronary artery disease involving coronary bypass graft of native heart with unstable angina pectoris  - Plan under ACS        VTE Risk Mitigation (From admission, onward)    None        Critical care time spent on the evaluation and treatment of severe organ dysfunction, review of pertinent labs and imaging studies, discussions with consulting providers and discussions with patient/family: 40 minutes.     Madhavi Camp MD  Department of Hospital Medicine   [facility]

## 2019-11-14 NOTE — PROGRESS NOTES
Dr. Castro at bedside and epi/lidocaine with fem stop placed to right groin for cont oozing to heart cath site. Cont to monitor.
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
## 2019-11-14 NOTE — ASSESSMENT & PLAN NOTE
Neuro intact post arrest  Emergent LHC, CAD involving grafts treated with PTCA  ICU hemodynamic monitoring  Post CPR sternal / musculoskeletal pain, trial tylenol, if continued trial lidoderm patch  Mobilize in AM

## 2019-11-14 NOTE — NURSING
Groin site dressing clean, dry, and intact. No hematoma, no tenderness to touch, no signs of bleeding

## 2019-11-15 VITALS
HEIGHT: 73 IN | BODY MASS INDEX: 26.36 KG/M2 | HEART RATE: 88 BPM | RESPIRATION RATE: 18 BRPM | OXYGEN SATURATION: 98 % | DIASTOLIC BLOOD PRESSURE: 62 MMHG | TEMPERATURE: 98 F | SYSTOLIC BLOOD PRESSURE: 134 MMHG | WEIGHT: 198.88 LBS

## 2019-11-15 PROBLEM — I46.9 CARDIAC ARREST: Status: RESOLVED | Noted: 2019-11-13 | Resolved: 2019-11-15

## 2019-11-15 PROBLEM — I24.9 ACUTE CORONARY SYNDROME: Status: RESOLVED | Noted: 2019-11-13 | Resolved: 2019-11-15

## 2019-11-15 PROBLEM — D72.829 LEUKOCYTOSIS: Status: RESOLVED | Noted: 2019-11-14 | Resolved: 2019-11-15

## 2019-11-15 LAB
ANION GAP SERPL CALC-SCNC: 7 MMOL/L (ref 8–16)
BUN SERPL-MCNC: 13 MG/DL (ref 6–20)
CALCIUM SERPL-MCNC: 8.9 MG/DL (ref 8.7–10.5)
CHLORIDE SERPL-SCNC: 108 MMOL/L (ref 95–110)
CHOLEST SERPL-MCNC: 126 MG/DL (ref 120–199)
CHOLEST/HDLC SERPL: 2.9 {RATIO} (ref 2–5)
CO2 SERPL-SCNC: 25 MMOL/L (ref 23–29)
CREAT SERPL-MCNC: 0.8 MG/DL (ref 0.5–1.4)
EST. GFR  (AFRICAN AMERICAN): >60 ML/MIN/1.73 M^2
EST. GFR  (NON AFRICAN AMERICAN): >60 ML/MIN/1.73 M^2
GLUCOSE SERPL-MCNC: 111 MG/DL (ref 70–110)
HDLC SERPL-MCNC: 44 MG/DL (ref 40–75)
HDLC SERPL: 34.9 % (ref 20–50)
LDLC SERPL CALC-MCNC: 60.8 MG/DL (ref 63–159)
MAGNESIUM SERPL-MCNC: 1.9 MG/DL (ref 1.6–2.6)
NONHDLC SERPL-MCNC: 82 MG/DL
POTASSIUM SERPL-SCNC: 4.5 MMOL/L (ref 3.5–5.1)
SODIUM SERPL-SCNC: 140 MMOL/L (ref 136–145)
TRIGL SERPL-MCNC: 106 MG/DL (ref 30–150)
TROPONIN I SERPL DL<=0.01 NG/ML-MCNC: 17.15 NG/ML (ref 0–0.03)

## 2019-11-15 PROCEDURE — 99232 SBSQ HOSP IP/OBS MODERATE 35: CPT | Mod: ,,, | Performed by: INTERNAL MEDICINE

## 2019-11-15 PROCEDURE — 36415 COLL VENOUS BLD VENIPUNCTURE: CPT

## 2019-11-15 PROCEDURE — 84484 ASSAY OF TROPONIN QUANT: CPT

## 2019-11-15 PROCEDURE — 80048 BASIC METABOLIC PNL TOTAL CA: CPT

## 2019-11-15 PROCEDURE — 99232 PR SUBSEQUENT HOSPITAL CARE,LEVL II: ICD-10-PCS | Mod: ,,, | Performed by: INTERNAL MEDICINE

## 2019-11-15 PROCEDURE — 80061 LIPID PANEL: CPT

## 2019-11-15 PROCEDURE — 25000003 PHARM REV CODE 250: Performed by: NURSE PRACTITIONER

## 2019-11-15 PROCEDURE — 83735 ASSAY OF MAGNESIUM: CPT

## 2019-11-15 RX ORDER — ATORVASTATIN CALCIUM 80 MG/1
80 TABLET, FILM COATED ORAL NIGHTLY
Qty: 30 TABLET | Refills: 0 | Status: SHIPPED | OUTPATIENT
Start: 2019-11-15 | End: 2019-12-15

## 2019-11-15 RX ORDER — CARVEDILOL 6.25 MG/1
6.25 TABLET ORAL 2 TIMES DAILY
Qty: 60 TABLET | Refills: 0 | Status: SHIPPED | OUTPATIENT
Start: 2019-11-15 | End: 2019-12-15

## 2019-11-15 RX ORDER — ASPIRIN 81 MG/1
81 TABLET ORAL DAILY
Refills: 0 | COMMUNITY
Start: 2019-11-16 | End: 2020-11-15

## 2019-11-15 RX ORDER — LOSARTAN POTASSIUM 25 MG/1
25 TABLET ORAL DAILY
Qty: 30 TABLET | Refills: 0 | Status: SHIPPED | OUTPATIENT
Start: 2019-11-16 | End: 2019-12-16

## 2019-11-15 RX ORDER — NITROGLYCERIN 0.4 MG/1
0.4 TABLET SUBLINGUAL EVERY 5 MIN PRN
Qty: 25 TABLET | Refills: 0 | Status: SHIPPED | OUTPATIENT
Start: 2019-11-15 | End: 2019-11-30

## 2019-11-15 RX ADMIN — DOCUSATE SODIUM 100 MG: 100 CAPSULE, LIQUID FILLED ORAL at 08:11

## 2019-11-15 RX ADMIN — TICAGRELOR 90 MG: 90 TABLET ORAL at 08:11

## 2019-11-15 RX ADMIN — CARVEDILOL 6.25 MG: 6.25 TABLET, FILM COATED ORAL at 08:11

## 2019-11-15 RX ADMIN — ASPIRIN 81 MG: 81 TABLET, COATED ORAL at 08:11

## 2019-11-15 RX ADMIN — LOSARTAN POTASSIUM 25 MG: 25 TABLET ORAL at 08:11

## 2019-11-15 RX ADMIN — PANTOPRAZOLE SODIUM 40 MG: 40 TABLET, DELAYED RELEASE ORAL at 08:11

## 2019-11-15 NOTE — SUBJECTIVE & OBJECTIVE
Interval History:   S/P LHC and PCI   Currently chest pain free .  Pt has no c/o today       Review of Systems   Constitutional: Negative for activity change, appetite change and fever.   HENT: Negative for sore throat.    Eyes: Negative for visual disturbance.   Respiratory: Negative for cough, chest tightness and shortness of breath.    Cardiovascular: Negative for chest pain, palpitations and leg swelling.   Gastrointestinal: Negative for abdominal distention, abdominal pain, constipation, diarrhea, nausea and vomiting.   Endocrine: Negative for polyuria.   Genitourinary: Negative for decreased urine volume, dysuria, flank pain, frequency and hematuria.   Musculoskeletal: Negative for back pain and gait problem.   Skin: Negative for rash.   Neurological: Negative for syncope, speech difficulty, weakness, light-headedness and headaches.   Psychiatric/Behavioral: Negative for confusion, hallucinations and sleep disturbance.     Objective:     Vital Signs (Most Recent):  Temp: 98.3 °F (36.8 °C) (11/14/19 1616)  Pulse: 64 (11/14/19 1616)  Resp: 18 (11/14/19 1616)  BP: 109/66 (11/14/19 1616)  SpO2: 97 % (11/14/19 1616) Vital Signs (24h Range):  Temp:  [97.6 °F (36.4 °C)-98.3 °F (36.8 °C)] 98.3 °F (36.8 °C)  Pulse:  [47-86] 64  Resp:  [10-20] 18  SpO2:  [95 %-100 %] 97 %  BP: (109-150)/(54-91) 109/66     Weight: 93.1 kg (205 lb 3.2 oz)  Body mass index is 27.07 kg/m².    Intake/Output Summary (Last 24 hours) at 11/14/2019 1905  Last data filed at 11/14/2019 0900  Gross per 24 hour   Intake 1606.94 ml   Output 750 ml   Net 856.94 ml      Physical Exam   Constitutional: He is oriented to person, place, and time. He appears well-developed and well-nourished. No distress.   HENT:   Head: Normocephalic and atraumatic.   Mouth/Throat: No oropharyngeal exudate.   Eyes: Pupils are equal, round, and reactive to light. Conjunctivae and EOM are normal.   Neck: Normal range of motion. Neck supple. No JVD present. No thyromegaly  present.   Cardiovascular: Normal rate, regular rhythm and normal heart sounds.   No murmur heard.  Pulmonary/Chest: Effort normal and breath sounds normal. No respiratory distress. He has no wheezes. He has no rales. He exhibits no tenderness.   Abdominal: Soft. Bowel sounds are normal. He exhibits no distension. There is no tenderness. There is no rebound and no guarding.   Musculoskeletal: Normal range of motion. He exhibits no edema.   Lymphadenopathy:     He has no cervical adenopathy.   Neurological: He is alert and oriented to person, place, and time. He displays normal reflexes. No cranial nerve deficit or sensory deficit.   Skin: Skin is warm and dry. No rash noted. He is not diaphoretic.   Psychiatric: He has a normal mood and affect.       Significant Labs:   CBC:   Recent Labs   Lab 11/13/19  1611 11/14/19  0311   WBC 14.31* 10.88  10.88   HGB 13.6* 14.2  14.2   HCT 40.8 43.1  43.1    242  242     CMP:   Recent Labs   Lab 11/13/19  1314 11/14/19  0311    135*   K 4.4 3.9    104   CO2 22* 22*   * 148*   BUN 15 12   CREATININE 1.0 0.9   CALCIUM 9.8 8.7   PROT 7.7  --    ALBUMIN 4.6  --    BILITOT 1.2*  --    ALKPHOS 64  --    AST 43*  --    ALT 25  --    ANIONGAP 13 9   EGFRNONAA >60 >60       Significant Imaging:

## 2019-11-15 NOTE — PROGRESS NOTES
[facility]  Cardiology  Progress Note    Patient Name: Deondre Monson  MRN: [id number]  Admission Date: 11/13/2019  Hospital Length of Stay: 2 days  Code Status: No Order   Attending Physician: Madhavi Camp MD   Primary Care Physician: Primary Doctor No  Expected Discharge Date:   Principal Problem:Acute coronary syndrome    Subjective:   HPI    Deondre Monson is a 59 year old male who presented to [facility] due to sudden onset of chest pain with radiation to bilateral jaws while driving today. He is a resident of [place] and is followed by Dr. Shaan Bey. His current medical conditions include CAD s/p CABG x 5V (2013), s/p PCI after CABG, HTN, HLP, PVCs. Cardiology consulted to assist with medical management. Chart reviewed, patient seen and examined in ED. He continues to complain of midsternal chest pain with radiation to bilateral jaws 5/10 at time of exam. Will attempt to obtain Recent Cath Report from Dr. Bey's office. Denies shortness of breath, MATHIS or palpitations. NO leg swelling or claudications. STAT ECHO pending. Will start IV heparin gtt for ACS protocol and given IV lopressor x 2 doses today. Will give additional NTG SL x 1 then NTP 1 inch today.Plans for urgent LHC today.Further recs to follow.       Hospital Course:   11/14/19--Patient seen and examined in room, lying in bed. No chest pain or SOB today. Feels good today on exam. Labs reviewed, stable.     11/15/19--patient seen and examined in room, lying in bed. Feels great today. Has some chest soreness post CPR. Labs reviewed, Troponin trending downward at 17.147 today, K+ 4.5, Cr 0.8, Mag 1.9.     Interval History: no acute issues noted o/n. OK to discharge home from Cardiology standpoint. Close cardiology follow up next week with primary cardiologist.     Review of Systems   Constitution: Positive for malaise/fatigue.   HENT: Negative for hearing loss and hoarse voice.    Eyes: Negative for blurred vision and visual  disturbance.   Cardiovascular: Positive for chest pain (post CPR soreness) and irregular heartbeat. Negative for claudication, dyspnea on exertion, leg swelling, near-syncope, orthopnea, palpitations, paroxysmal nocturnal dyspnea and syncope.   Respiratory: Negative for cough, hemoptysis, shortness of breath, sleep disturbances due to breathing, snoring and wheezing.    Endocrine: Negative for cold intolerance and heat intolerance.   Hematologic/Lymphatic: Bruises/bleeds easily.   Skin: Negative for color change, dry skin and nail changes.   Musculoskeletal: Positive for arthritis. Negative for back pain, joint pain and myalgias.   Gastrointestinal: Negative for bloating, abdominal pain, constipation, nausea and vomiting.   Genitourinary: Negative for dysuria, flank pain, hematuria and hesitancy.   Neurological: Negative for headaches, light-headedness, loss of balance, numbness, paresthesias and weakness.   Psychiatric/Behavioral: Negative for altered mental status.   Allergic/Immunologic: Negative for environmental allergies.     Objective:     Vital Signs (Most Recent):  Temp: 97.9 °F (36.6 °C) (11/15/19 1204)  Pulse: 83 (11/15/19 1204)  Resp: 18 (11/15/19 1204)  BP: 134/62 (11/15/19 1204)  SpO2: 98 % (11/15/19 1204) Vital Signs (24h Range):  Temp:  [97.6 °F (36.4 °C)-98.3 °F (36.8 °C)] 97.9 °F (36.6 °C)  Pulse:  [36-85] 83  Resp:  [18] 18  SpO2:  [97 %-98 %] 98 %  BP: (107-134)/(59-75) 134/62     Weight: 90.2 kg (198 lb 13.7 oz)  Body mass index is 26.24 kg/m².     SpO2: 98 %  O2 Device (Oxygen Therapy): room air      Intake/Output Summary (Last 24 hours) at 11/15/2019 1342  Last data filed at 11/15/2019 0500  Gross per 24 hour   Intake 630 ml   Output 1050 ml   Net -420 ml       Lines/Drains/Airways     Peripheral Intravenous Line                 Peripheral IV - Single Lumen 11/13/19 1320 20 G Right Hand 2 days                Physical Exam   Constitutional: He is oriented to person, place, and time. He appears  well-developed and well-nourished. No distress.   HENT:   Head: Normocephalic and atraumatic.   Eyes: Pupils are equal, round, and reactive to light.   Neck: Normal range of motion and full passive range of motion without pain. Neck supple. No JVD present.   Cardiovascular: Normal rate, regular rhythm, S1 normal, S2 normal and intact distal pulses.  Occasional extrasystoles are present. PMI is not displaced. Exam reveals no distant heart sounds.   No murmur heard.  Pulses:       Radial pulses are 2+ on the right side, and 2+ on the left side.        Dorsalis pedis pulses are 2+ on the right side, and 2+ on the left side.   Right groin femoral access C/D/I, no hematoma or ecchymosis noted.    Pulmonary/Chest: Effort normal and breath sounds normal. No accessory muscle usage. No respiratory distress. He has no decreased breath sounds. He has no wheezes. He has no rales.   Abdominal: Soft. Bowel sounds are normal. He exhibits no distension. There is no tenderness.   Musculoskeletal: Normal range of motion. He exhibits no edema.        Right ankle: He exhibits no swelling.        Left ankle: He exhibits no swelling.   Neurological: He is alert and oriented to person, place, and time.   Skin: Skin is warm and dry. He is not diaphoretic. No cyanosis. Nails show no clubbing.   Psychiatric: He has a normal mood and affect. His speech is normal and behavior is normal. Judgment and thought content normal. Cognition and memory are normal.   Nursing note and vitals reviewed.      Significant Labs:   BMP:   Recent Labs   Lab 11/14/19  0311 11/15/19  0457   * 111*   * 140   K 3.9 4.5    108   CO2 22* 25   BUN 12 13   CREATININE 0.9 0.8   CALCIUM 8.7 8.9   MG  --  1.9   , CBC   Recent Labs   Lab 11/13/19  1611 11/14/19  0311   WBC 14.31* 10.88  10.88   HGB 13.6* 14.2  14.2   HCT 40.8 43.1  43.1    242  242   , Troponin   Recent Labs   Lab 11/13/19  1611 11/14/19  0311 11/15/19  1131   TROPONINI  18.500* >50.000* 17.147*    and All pertinent lab results from the last 24 hours have been reviewed.    Significant Imaging: Echocardiogram:   2D echo with color flow doppler:   Results for orders placed or performed during the hospital encounter of 11/13/19   2D echo with color flow doppler   Result Value Ref Range    QEF 50 55 - 65    Diastolic Dysfunction No     Est. PA Systolic Pressure 26.83     Tricuspid Valve Regurgitation TRIVIAL     Narrative    Date of Procedure: 11/13/2019        TEST DESCRIPTION   Technical Quality: This is a portable study performed at the patient's bedside. This is a technically challenging study. There is poor endocardial definition.     Aorta: The aortic root is normal in size, measuring 3.1 cm at sinotubular junction and 3.3 cm at Sinuses of Valsalva. The proximal ascending aorta is normal in size, measuring 2.9 cm across.     Left Atrium: The left atrial volume index is mildly enlarged, measuring 36.55 cc/m2.     Left Ventricle: The left ventricle is normal in size, with an end-diastolic diameter of 5.6 cm, and an end-systolic diameter of 4.1 cm. LV wall thickness is normal, with the septum measuring 1.2 cm and the posterior wall measuring 1.3 cm across. Relative   wall thickness was increased at 0.46, and the LV mass index was increased at 164.3 g/m2 consistent with concentric left ventricular hypertrophy. The following segments were moderately hypokinetic: apical septum, mid inferoseptum, apical inferior wall,   mid inferior wall.  Left ventricular systolic function appears low normal to mildly depressed. Visually estimated ejection fraction is 50-55%.     Diastolic indices: Diastolic function is normal.     Right Atrium: The right atrium is normal in size, measuring 5.0 cm in length and 4.5 cm in width in the apical view.     Right Ventricle: The right ventricle is normal in size. Global right ventricular systolic function appears normal. The estimated PA systolic pressure is  greater than 27 mmHg.     Aortic Valve:  Aortic valve is normal in structure with normal leaflet mobility.     Mitral Valve:  Mitral valve is normal in structure with normal leaflet mobility.     Tricuspid Valve:  Tricuspid valve is normal in structure with normal leaflet mobility. There is trivial tricuspid regurgitation.     Pulmonary Valve:  Pulmonary valve is normal in structure with normal leaflet mobility. There is trivial pulmonic regurgitation.     IVC: The IVC is not visualized.     Atrial Septum: The atrial septum is intact.     Intracavitary: There is no evidence of pericardial effusion, intracavity mass, thrombi, or vegetation.         CONCLUSIONS     1 - Mild left atrial enlargement.     2 - Concentric hypertrophy.     3 - Wall motion abnormalities.     4 - Low normal to mildly depressed left ventricular systolic function (EF 50-55%).     5 - Normal left ventricular diastolic function.     6 - Normal right ventricular systolic function .     7 - The estimated PA systolic pressure is greater than 27 mmHg.     8 - Trivial tricuspid regurgitation.     9 - Trivial pulmonic regurgitation.             This document has been electronically    SIGNED BY: Masha Castro MD On: 11/13/2019 20:36     Assessment and Plan:       * Acute coronary syndrome/ STEMI  Labs pending  Patient complains of ongoing mid-sternal chest pain with radiation to bilateral jaw  EKG with ST-T Wave abnormality, ST depression noted on EKG today  ECHO completed, pending  Start IV heparin gtt for ACS today  IV Lopressor x 2 doses  NTG SL followed by NTP 1 inch to chest wall today  Repeat EKG pending  Plan for Urgent LHC today per Dr. Castro  Request old Cath report from [facility], Dr. Shaan Bey\  Further recs to follow LHC today.     After Evaluation in ED, patient had episode of VFIB requiring defibrillation.  Patient will be brought to Cath Lab for Urgent LH per Dr. Castro.   Dr. Castro spoke to Dr. Bey regarding patient's  last [facility] results.     11/14  -s/p PCI per Dr. Castro yesterday  -Continue ASA, Statin, BB, ARB, Brilinta  -OK to transfer to telemetry  -FLP pending  -reassess in AM    11/15  -Continue ASA, Statin, BB, ARB, Brilinta  -Ok to discharge home from cardiology standpoint      Cardiac arrest  -continue current medical management    Coronary artery disease involving coronary bypass graft of native heart with unstable angina pectoris  See plan for ACS        VTE Risk Mitigation (From admission, onward)         Ordered     enoxaparin injection 40 mg  Daily      11/14/19 0909                Coral Gale, SANYA-C  Cardiology  [facility]

## 2019-11-15 NOTE — SUBJECTIVE & OBJECTIVE
Interval History:  Pt is doing well. Denies chest pain, jaw pain, SOB or palpitations.  Vitals are reviewed . Occasional bradycardia is noted. Pt is asymptomatic.  Possible D/C once pt is seen and cleared by Cardiology.     Review of Systems   Constitutional: Negative for activity change, appetite change and fever.   HENT: Negative for sore throat.    Eyes: Negative for visual disturbance.   Respiratory: Negative for cough, chest tightness and shortness of breath.    Cardiovascular: Negative for chest pain, palpitations and leg swelling.   Gastrointestinal: Negative for abdominal distention, abdominal pain, constipation, diarrhea, nausea and vomiting.   Endocrine: Negative for polyuria.   Genitourinary: Negative for decreased urine volume, dysuria, flank pain, frequency and hematuria.   Musculoskeletal: Negative for back pain and gait problem.   Skin: Negative for rash.   Neurological: Negative for syncope, speech difficulty, weakness, light-headedness and headaches.   Psychiatric/Behavioral: Negative for confusion, hallucinations and sleep disturbance.     Objective:     Vital Signs (Most Recent):  Temp: 97.9 °F (36.6 °C) (11/15/19 1204)  Pulse: 83 (11/15/19 1204)  Resp: 18 (11/15/19 1204)  BP: 134/62 (11/15/19 1204)  SpO2: 98 % (11/15/19 1204) Vital Signs (24h Range):  Temp:  [97.6 °F (36.4 °C)-98.3 °F (36.8 °C)] 97.9 °F (36.6 °C)  Pulse:  [36-85] 83  Resp:  [18] 18  SpO2:  [97 %-98 %] 98 %  BP: (107-134)/(59-75) 134/62     Weight: 90.2 kg (198 lb 13.7 oz)  Body mass index is 26.24 kg/m².    Intake/Output Summary (Last 24 hours) at 11/15/2019 1410  Last data filed at 11/15/2019 0500  Gross per 24 hour   Intake 630 ml   Output 1050 ml   Net -420 ml      Physical Exam   Constitutional: He is oriented to person, place, and time. He appears well-developed and well-nourished. No distress.   HENT:   Head: Normocephalic and atraumatic.   Mouth/Throat: No oropharyngeal exudate.   Eyes: Pupils are equal, round, and  reactive to light. Conjunctivae and EOM are normal.   Neck: Normal range of motion. Neck supple. No JVD present. No thyromegaly present.   Cardiovascular: Normal rate, regular rhythm and normal heart sounds.   No murmur heard.  Pulmonary/Chest: Effort normal and breath sounds normal. No respiratory distress. He has no wheezes. He has no rales. He exhibits no tenderness.   Abdominal: Soft. Bowel sounds are normal. He exhibits no distension. There is no tenderness. There is no rebound and no guarding.   Musculoskeletal: Normal range of motion. He exhibits no edema.   Lymphadenopathy:     He has no cervical adenopathy.   Neurological: He is alert and oriented to person, place, and time. He displays normal reflexes. No cranial nerve deficit or sensory deficit.   Skin: Skin is warm and dry. No rash noted. He is not diaphoretic.   Psychiatric: He has a normal mood and affect.       Significant Labs:   BMP:   Recent Labs   Lab 11/15/19  0457   *      K 4.5      CO2 25   BUN 13   CREATININE 0.8   CALCIUM 8.9   MG 1.9     CBC:   Recent Labs   Lab 11/13/19  1611 11/14/19  0311   WBC 14.31* 10.88  10.88   HGB 13.6* 14.2  14.2   HCT 40.8 43.1  43.1    242  242     CMP:   Recent Labs   Lab 11/14/19  0311 11/15/19  0457   * 140   K 3.9 4.5    108   CO2 22* 25   * 111*   BUN 12 13   CREATININE 0.9 0.8   CALCIUM 8.7 8.9   ANIONGAP 9 7*   EGFRNONAA >60 >60       Significant Imaging:

## 2019-11-15 NOTE — ASSESSMENT & PLAN NOTE
- Pt underwent emergent PCI - ORSIRO stent x2 - SVG to OM , SVG to PDA  -ASA 81 mg , Ticagrelor 90 mg BID  -BB -carvedilol 6.25 mg BID  -ARB losartan 25 mg daily   -High intensity statin Atorvastatin 80 mg daily   -Heparin ggt x 48h  -NTG  -GPIIb/IIa inhibitor   11/14/19-  Continue ASA, Ticagrelor , ARB, BB, Statin   11/15/19-  Troponin is trending down   Continue ASA, Ticagrelor , ARB, BB, high intensity statin.

## 2019-11-15 NOTE — HOSPITAL COURSE
11/14/19-  Seen at bedside . Pt is awake , alert and oriented.   Denies chest pain, jaw pain or SOB.  Pt has no c/o today     11/15/19-  Continues to do well. Denies c/o.   Denies chest pain, SOB, palpitations.  Vitals are reviewed. Occasional bradycardia is noted.  Labs are unremarkable . Troponin is trending down.  Possible D/C once cleared by Cardiology   Addendum-   Pt was seen and cleared by Cardiology for discharge to  home today

## 2019-11-15 NOTE — PLAN OF CARE
11/15/19 1515   Discharge Assessment   Assessment Type Discharge Planning Assessment   Assessment information obtained from? Medical Record   Prior to hospitilization cognitive status: Alert/Oriented   Prior to hospitalization functional status: Independent   Current cognitive status: Alert/Oriented   Current Functional Status: Independent   Able to Return to Prior Arrangements yes   Is patient able to care for self after discharge? Yes   Readmission Within the Last 30 Days no previous admission in last 30 days   Equipment Currently Used at Home none   Discharge Plan A Home   DME Needed Upon Discharge  none   Patient/Family in Agreement with Plan unable to assess     Attempted to see pt for DC assessment. Pt had already DCed with no needs.   Russell Portillo LMSW 11/15/2019 3:17 PM

## 2019-11-15 NOTE — ASSESSMENT & PLAN NOTE
- Pt underwent emergent PCI - ORSIRO stent x2 - SVG to OM , SVG to PDA  -ASA 81 mg , Ticagrelor 90 mg BID  -BB -carvedilol 6.25 mg BID  -ARB losartan 25 mg daily   -High intensity statin Atorvastatin 80 mg daily   -Heparin ggt x 48h  -NTG  -GPIIb/IIa inhibitor   11/14/19-  Continue ASA, Ticagrelor , ARB, BB, Statin

## 2019-11-15 NOTE — PROGRESS NOTES
[facility] Hospital Medicine  Progress Note    Patient Name: Deondre Monson  MRN: [id number]  Patient Class: IP- Inpatient   Admission Date: 11/13/2019  Length of Stay: 1 days  Attending Physician: Madhavi Camp MD  Primary Care Provider: Primary Doctor No        Subjective:     Principal Problem:Acute coronary syndrome        HPI:  The pt is a 60 yo man , resident of [place] who works here in [place], [place] drove himself to the Emergency room for evaluation of chest pain that started while he was driving down the road 30 min prior to the arrival to the ER. His past medical history is significant for CABG x 5 vessels in 2013 and PCI post CABG in Jan'2019 and  , Hyperlipidemia. Onset of chest pain was spontaneous and persistent since onset . Pt described pain as pressure sensation in the mid chest  Radiated  to both side of the jaw. Denies associated SOB, diaphoresis , lightheadedness or dizziness . Denies cough , palpitations , nausea or vomiting, tingling or numbness to the extremities. Initial EKG at 1259  revealed SR , PVCs, non specific ST/T wave change and prolonged QT. Cardiac troponin was < 0.006.  At 1300 pt was given  mg and started on NTG s/l, NTG paste , Heparin bolus followed by ggt and Lopressor injection and was evaluated by Cardiology.    At 1342 while pt was boarding in ER , he became unresponsive and went into pulseless VF . CPR was initiated and pt had received epi X 1 and defibrillation x1 with ROSC. Total code time was 5 min. EKG at 1355 showed ST elevation involving inferior and lateral leads consistent with Acute STEMI  and cardiac troponin bumped to  18.5. Pt underwent emergent LHC revealed occluded RCA, occluded SVG to OM and D1, Occluded LAD and LCX, Patent LIMA to LAD. Pt was treated with stenting and thrombectomy and  subsequently transported to ICU for further management.         Overview/Hospital Course:  11/14/19-  Seen at bedside . Pt is awake , alert  and oriented.   Denies chest pain, jaw pain or SOB.  Pt has no c/o today     Interval History:   S/P LHC and PCI   Currently chest pain free .  Pt has no c/o today       Review of Systems   Constitutional: Negative for activity change, appetite change and fever.   HENT: Negative for sore throat.    Eyes: Negative for visual disturbance.   Respiratory: Negative for cough, chest tightness and shortness of breath.    Cardiovascular: Negative for chest pain, palpitations and leg swelling.   Gastrointestinal: Negative for abdominal distention, abdominal pain, constipation, diarrhea, nausea and vomiting.   Endocrine: Negative for polyuria.   Genitourinary: Negative for decreased urine volume, dysuria, flank pain, frequency and hematuria.   Musculoskeletal: Negative for back pain and gait problem.   Skin: Negative for rash.   Neurological: Negative for syncope, speech difficulty, weakness, light-headedness and headaches.   Psychiatric/Behavioral: Negative for confusion, hallucinations and sleep disturbance.     Objective:     Vital Signs (Most Recent):  Temp: 98.3 °F (36.8 °C) (11/14/19 1616)  Pulse: 64 (11/14/19 1616)  Resp: 18 (11/14/19 1616)  BP: 109/66 (11/14/19 1616)  SpO2: 97 % (11/14/19 1616) Vital Signs (24h Range):  Temp:  [97.6 °F (36.4 °C)-98.3 °F (36.8 °C)] 98.3 °F (36.8 °C)  Pulse:  [47-86] 64  Resp:  [10-20] 18  SpO2:  [95 %-100 %] 97 %  BP: (109-150)/(54-91) 109/66     Weight: 93.1 kg (205 lb 3.2 oz)  Body mass index is 27.07 kg/m².    Intake/Output Summary (Last 24 hours) at 11/14/2019 1905  Last data filed at 11/14/2019 0900  Gross per 24 hour   Intake 1606.94 ml   Output 750 ml   Net 856.94 ml      Physical Exam   Constitutional: He is oriented to person, place, and time. He appears well-developed and well-nourished. No distress.   HENT:   Head: Normocephalic and atraumatic.   Mouth/Throat: No oropharyngeal exudate.   Eyes: Pupils are equal, round, and reactive to light. Conjunctivae and EOM are normal.    Neck: Normal range of motion. Neck supple. No JVD present. No thyromegaly present.   Cardiovascular: Normal rate, regular rhythm and normal heart sounds.   No murmur heard.  Pulmonary/Chest: Effort normal and breath sounds normal. No respiratory distress. He has no wheezes. He has no rales. He exhibits no tenderness.   Abdominal: Soft. Bowel sounds are normal. He exhibits no distension. There is no tenderness. There is no rebound and no guarding.   Musculoskeletal: Normal range of motion. He exhibits no edema.   Lymphadenopathy:     He has no cervical adenopathy.   Neurological: He is alert and oriented to person, place, and time. He displays normal reflexes. No cranial nerve deficit or sensory deficit.   Skin: Skin is warm and dry. No rash noted. He is not diaphoretic.   Psychiatric: He has a normal mood and affect.       Significant Labs:   CBC:   Recent Labs   Lab 11/13/19  1611 11/14/19  0311   WBC 14.31* 10.88  10.88   HGB 13.6* 14.2  14.2   HCT 40.8 43.1  43.1    242  242     CMP:   Recent Labs   Lab 11/13/19  1314 11/14/19  0311    135*   K 4.4 3.9    104   CO2 22* 22*   * 148*   BUN 15 12   CREATININE 1.0 0.9   CALCIUM 9.8 8.7   PROT 7.7  --    ALBUMIN 4.6  --    BILITOT 1.2*  --    ALKPHOS 64  --    AST 43*  --    ALT 25  --    ANIONGAP 13 9   EGFRNONAA >60 >60       Significant Imaging:       Assessment/Plan:      * Acute coronary syndrome/ STEMI  - Pt underwent emergent PCI - ORSIRO stent x2 - SVG to OM , SVG to PDA  -ASA 81 mg , Ticagrelor 90 mg BID  -BB -carvedilol 6.25 mg BID  -ARB losartan 25 mg daily   -High intensity statin Atorvastatin 80 mg daily   -Heparin ggt x 48h  -NTG  -GPIIb/IIa inhibitor   11/14/19-  Continue ASA, Ticagrelor , ARB, BB, Statin       Cardiac arrest  - due to ACS/STEMI  -Pt was successfully resuscitated and underwent primary PCI      Coronary artery disease involving coronary bypass graft of native heart with unstable angina pectoris  - Plan  under ACS        VTE Risk Mitigation (From admission, onward)         Ordered     enoxaparin injection 40 mg  Daily      11/14/19 0909                      Madhavi Camp MD  Department of Hospital Medicine   [facility]

## 2019-11-15 NOTE — NURSING
Discharged order received and reviewed with patient and family. Patient instructed when to take each medication next dose. Prescriptions received at bedside. IV removed, tele removed. Patient assisted with dressing by staff. Patient walked to front lobby by staff for family to transport home.

## 2019-11-15 NOTE — PLAN OF CARE
Problem: Adult Inpatient Plan of Care  Goal: Plan of Care Review  Outcome: Ongoing, Progressing   Pt AAOx4. VSS. Pt remained free of falls this shift. Pain controlled with PRN medications. Medications administered as ordered. Pt is NSR with frequent PVCs on monitor. PIV intact, saline locked. Hourly rounding completed. Pt instructed to call for assistance. POC reviewed. Pt verbalized understanding. Will continue to monitor.

## 2019-11-15 NOTE — PLAN OF CARE
Patient had no significant changes during shift. Patient only asked for tylenol for back pain. Groin site remained clean, dry, and intact during shift.

## 2019-11-15 NOTE — SUBJECTIVE & OBJECTIVE
Interval History: no acute issues noted o/n. OK to discharge home from Cardiology standpoint. Close cardiology follow up next week with primary cardiologist.     Review of Systems   Constitution: Positive for malaise/fatigue.   HENT: Negative for hearing loss and hoarse voice.    Eyes: Negative for blurred vision and visual disturbance.   Cardiovascular: Positive for chest pain (post CPR soreness) and irregular heartbeat. Negative for claudication, dyspnea on exertion, leg swelling, near-syncope, orthopnea, palpitations, paroxysmal nocturnal dyspnea and syncope.   Respiratory: Negative for cough, hemoptysis, shortness of breath, sleep disturbances due to breathing, snoring and wheezing.    Endocrine: Negative for cold intolerance and heat intolerance.   Hematologic/Lymphatic: Bruises/bleeds easily.   Skin: Negative for color change, dry skin and nail changes.   Musculoskeletal: Positive for arthritis. Negative for back pain, joint pain and myalgias.   Gastrointestinal: Negative for bloating, abdominal pain, constipation, nausea and vomiting.   Genitourinary: Negative for dysuria, flank pain, hematuria and hesitancy.   Neurological: Negative for headaches, light-headedness, loss of balance, numbness, paresthesias and weakness.   Psychiatric/Behavioral: Negative for altered mental status.   Allergic/Immunologic: Negative for environmental allergies.     Objective:     Vital Signs (Most Recent):  Temp: 97.9 °F (36.6 °C) (11/15/19 1204)  Pulse: 83 (11/15/19 1204)  Resp: 18 (11/15/19 1204)  BP: 134/62 (11/15/19 1204)  SpO2: 98 % (11/15/19 1204) Vital Signs (24h Range):  Temp:  [97.6 °F (36.4 °C)-98.3 °F (36.8 °C)] 97.9 °F (36.6 °C)  Pulse:  [36-85] 83  Resp:  [18] 18  SpO2:  [97 %-98 %] 98 %  BP: (107-134)/(59-75) 134/62     Weight: 90.2 kg (198 lb 13.7 oz)  Body mass index is 26.24 kg/m².     SpO2: 98 %  O2 Device (Oxygen Therapy): room air      Intake/Output Summary (Last 24 hours) at 11/15/2019 1342  Last data filed  at 11/15/2019 0500  Gross per 24 hour   Intake 630 ml   Output 1050 ml   Net -420 ml       Lines/Drains/Airways     Peripheral Intravenous Line                 Peripheral IV - Single Lumen 11/13/19 1320 20 G Right Hand 2 days                Physical Exam   Constitutional: He is oriented to person, place, and time. He appears well-developed and well-nourished. No distress.   HENT:   Head: Normocephalic and atraumatic.   Eyes: Pupils are equal, round, and reactive to light.   Neck: Normal range of motion and full passive range of motion without pain. Neck supple. No JVD present.   Cardiovascular: Normal rate, regular rhythm, S1 normal, S2 normal and intact distal pulses.  Occasional extrasystoles are present. PMI is not displaced. Exam reveals no distant heart sounds.   No murmur heard.  Pulses:       Radial pulses are 2+ on the right side, and 2+ on the left side.        Dorsalis pedis pulses are 2+ on the right side, and 2+ on the left side.   Right groin femoral access C/D/I, no hematoma or ecchymosis noted.    Pulmonary/Chest: Effort normal and breath sounds normal. No accessory muscle usage. No respiratory distress. He has no decreased breath sounds. He has no wheezes. He has no rales.   Abdominal: Soft. Bowel sounds are normal. He exhibits no distension. There is no tenderness.   Musculoskeletal: Normal range of motion. He exhibits no edema.        Right ankle: He exhibits no swelling.        Left ankle: He exhibits no swelling.   Neurological: He is alert and oriented to person, place, and time.   Skin: Skin is warm and dry. He is not diaphoretic. No cyanosis. Nails show no clubbing.   Psychiatric: He has a normal mood and affect. His speech is normal and behavior is normal. Judgment and thought content normal. Cognition and memory are normal.   Nursing note and vitals reviewed.      Significant Labs:   BMP:   Recent Labs   Lab 11/14/19  0311 11/15/19  0457   * 111*   * 140   K 3.9 4.5    108    CO2 22* 25   BUN 12 13   CREATININE 0.9 0.8   CALCIUM 8.7 8.9   MG  --  1.9   , CBC   Recent Labs   Lab 11/13/19  1611 11/14/19  0311   WBC 14.31* 10.88  10.88   HGB 13.6* 14.2  14.2   HCT 40.8 43.1  43.1    242  242   , Troponin   Recent Labs   Lab 11/13/19  1611 11/14/19  0311 11/15/19  1131   TROPONINI 18.500* >50.000* 17.147*    and All pertinent lab results from the last 24 hours have been reviewed.    Significant Imaging: Echocardiogram:   2D echo with color flow doppler:   Results for orders placed or performed during the hospital encounter of 11/13/19   2D echo with color flow doppler   Result Value Ref Range    QEF 50 55 - 65    Diastolic Dysfunction No     Est. PA Systolic Pressure 26.83     Tricuspid Valve Regurgitation TRIVIAL     Narrative    Date of Procedure: 11/13/2019        TEST DESCRIPTION   Technical Quality: This is a portable study performed at the patient's bedside. This is a technically challenging study. There is poor endocardial definition.     Aorta: The aortic root is normal in size, measuring 3.1 cm at sinotubular junction and 3.3 cm at Sinuses of Valsalva. The proximal ascending aorta is normal in size, measuring 2.9 cm across.     Left Atrium: The left atrial volume index is mildly enlarged, measuring 36.55 cc/m2.     Left Ventricle: The left ventricle is normal in size, with an end-diastolic diameter of 5.6 cm, and an end-systolic diameter of 4.1 cm. LV wall thickness is normal, with the septum measuring 1.2 cm and the posterior wall measuring 1.3 cm across. Relative   wall thickness was increased at 0.46, and the LV mass index was increased at 164.3 g/m2 consistent with concentric left ventricular hypertrophy. The following segments were moderately hypokinetic: apical septum, mid inferoseptum, apical inferior wall,   mid inferior wall.  Left ventricular systolic function appears low normal to mildly depressed. Visually estimated ejection fraction is 50-55%.     Diastolic  indices: Diastolic function is normal.     Right Atrium: The right atrium is normal in size, measuring 5.0 cm in length and 4.5 cm in width in the apical view.     Right Ventricle: The right ventricle is normal in size. Global right ventricular systolic function appears normal. The estimated PA systolic pressure is greater than 27 mmHg.     Aortic Valve:  Aortic valve is normal in structure with normal leaflet mobility.     Mitral Valve:  Mitral valve is normal in structure with normal leaflet mobility.     Tricuspid Valve:  Tricuspid valve is normal in structure with normal leaflet mobility. There is trivial tricuspid regurgitation.     Pulmonary Valve:  Pulmonary valve is normal in structure with normal leaflet mobility. There is trivial pulmonic regurgitation.     IVC: The IVC is not visualized.     Atrial Septum: The atrial septum is intact.     Intracavitary: There is no evidence of pericardial effusion, intracavity mass, thrombi, or vegetation.         CONCLUSIONS     1 - Mild left atrial enlargement.     2 - Concentric hypertrophy.     3 - Wall motion abnormalities.     4 - Low normal to mildly depressed left ventricular systolic function (EF 50-55%).     5 - Normal left ventricular diastolic function.     6 - Normal right ventricular systolic function .     7 - The estimated PA systolic pressure is greater than 27 mmHg.     8 - Trivial tricuspid regurgitation.     9 - Trivial pulmonic regurgitation.             This document has been electronically    SIGNED BY: Masha Castro MD On: 11/13/2019 20:36

## 2019-11-15 NOTE — ASSESSMENT & PLAN NOTE
Labs pending  Patient complains of ongoing mid-sternal chest pain with radiation to bilateral jaw  EKG with ST-T Wave abnormality, ST depression noted on EKG today  ECHO completed, pending  Start IV heparin gtt for ACS today  IV Lopressor x 2 doses  NTG SL followed by NTP 1 inch to chest wall today  Repeat EKG pending  Plan for Urgent LHC today per Dr. Castro  Request old Cath report from [facility], Dr. Shaan Bey\  Further recs to follow LHC today.     After Evaluation in ED, patient had episode of VFIB requiring defibrillation.  Patient will be brought to Cath Lab for Urgent LH per Dr. Castro.   Dr. Castro spoke to Dr. Bey regarding patient's last LHC results.     11/14  -s/p PCI per Dr. Castro yesterday  -Continue ASA, Statin, BB, ARB, Brilinta  -OK to transfer to telemetry  -FLP pending  -reassess in AM    11/15  -Continue ASA, Statin, BB, ARB, Brilinta  -Ok to discharge home from cardiology standpoint

## 2019-11-15 NOTE — DISCHARGE SUMMARY
[facility] Hospital Medicine  Discharge Summary      Patient Name: Deondre Monson  MRN: [id number]  Admission Date: 11/13/2019  Hospital Length of Stay: 2 days  Discharge Date and Time:  11/15/2019 2:53 PM  Attending Physician: Madhavi Camp MD   Discharging Provider: Madhavi Camp MD  Primary Care Provider: Primary Doctor No      HPI:   The pt is a 58 yo man , resident of [place] who works here in [place], [place] drove himself to the Emergency room for evaluation of chest pain that started while he was driving down the road 30 min prior to the arrival to the ER. His past medical history is significant for CABG x 5 vessels in 2013 and PCI post CABG in Jan'2019 and  , Hyperlipidemia. Onset of chest pain was spontaneous and persistent since onset . Pt described pain as pressure sensation in the mid chest  Radiated  to both side of the jaw. Denies associated SOB, diaphoresis , lightheadedness or dizziness . Denies cough , palpitations , nausea or vomiting, tingling or numbness to the extremities. Initial EKG at 1259  revealed SR , PVCs, non specific ST/T wave change and prolonged QT. Cardiac troponin was < 0.006.  At 1300 pt was given  mg and started on NTG s/l, NTG paste , Heparin bolus followed by ggt and Lopressor injection and was evaluated by Cardiology.    At 1342 while pt was boarding in ER , he became unresponsive and went into pulseless VF . CPR was initiated and pt had received epi X 1 and defibrillation x1 with ROSC. Total code time was 5 min. EKG at 1355 showed ST elevation involving inferior and lateral leads consistent with Acute STEMI  and cardiac troponin bumped to  18.5. Pt underwent emergent LHC revealed occluded RCA, occluded SVG to OM and D1, Occluded LAD and LCX, Patent LIMA to LAD. Pt was treated with stenting and thrombectomy and  subsequently transported to ICU for further management.         Procedure(s) (LRB):  CATHETERIZATION, HEART, LEFT (Left)       Hospital Course:   11/14/19-  Seen at bedside . Pt is awake , alert and oriented.   Denies chest pain, jaw pain or SOB.  Pt has no c/o today     11/15/19-  Continues to do well. Denies c/o.   Denies chest pain, SOB, palpitations.  Vitals are reviewed. Occasional bradycardia is noted.  Labs are unremarkable . Troponin is trending down.  Possible D/C once cleared by Cardiology   Addendum-   Pt was seen and cleared by Cardiology for discharge to  home today      Consults:   Consults (From admission, onward)        Status Ordering Provider     Inpatient consult to Cardiology  Once     Provider:  (Not yet assigned)    Completed AUGUSTO MEYER     Inpatient consult to Pulmonology  Once     Provider:  Jason Ruelas MD    Completed SERAFIN DE JESUS          No new Assessment & Plan notes have been filed under this hospital service since the last note was generated.  Service: Hospital Medicine    Final Active Diagnoses:    Diagnosis Date Noted POA    Coronary artery disease involving coronary bypass graft of native heart with unstable angina pectoris [I25.700] 11/13/2019 Yes      Problems Resolved During this Admission:    Diagnosis Date Noted Date Resolved POA    PRINCIPAL PROBLEM:  Acute coronary syndrome/ STEMI [I24.9] 11/13/2019 11/15/2019 Yes    Cardiac arrest [I46.9] 11/13/2019 11/15/2019 Yes    Leukocytosis [D72.829] 11/14/2019 11/15/2019 Yes       Discharged Condition: stable    Disposition: Home or Self Care    Follow Up:  Follow-up Information     Primary Doctor No.           Primary Doctor No.    Why:  Pt will keep appointment with his Cardiologist  in [place] next week as scheduled                Patient Instructions:      Diet Cardiac     Activity as tolerated       Significant Diagnostic Studies: Labs:   BMP:   Recent Labs   Lab 11/14/19  0311 11/15/19  0457   * 111*   * 140   K 3.9 4.5    108   CO2 22* 25   BUN 12 13   CREATININE 0.9 0.8   CALCIUM 8.7 8.9   MG   --  1.9   , CMP   Recent Labs   Lab 11/14/19  0311 11/15/19  0457   * 140   K 3.9 4.5    108   CO2 22* 25   * 111*   BUN 12 13   CREATININE 0.9 0.8   CALCIUM 8.7 8.9   ANIONGAP 9 7*   ESTGFRAFRICA >60 >60   EGFRNONAA >60 >60    and CBC   Recent Labs   Lab 11/13/19  1611 11/14/19 0311   WBC 14.31* 10.88  10.88   HGB 13.6* 14.2  14.2   HCT 40.8 43.1  43.1    242  242       Pending Diagnostic Studies:     Procedure Component Value Units Date/Time    Lipid panel [[id number]] Collected:  11/15/19 0457    Order Status:  Sent Lab Status:  In process Updated:  11/15/19 1416    Specimen:  Blood          Medications:  Reconciled Home Medications:      Medication List      START taking these medications    aspirin 81 MG EC tablet  Commonly known as:  ECOTRIN  Take 1 tablet (81 mg total) by mouth once daily.  Start taking on:  November 16, 2019     atorvastatin 80 MG tablet  Commonly known as:  LIPITOR  Take 1 tablet (80 mg total) by mouth every evening.     carvedilol 6.25 MG tablet  Commonly known as:  COREG  Take 1 tablet (6.25 mg total) by mouth 2 (two) times daily.     losartan 25 MG tablet  Commonly known as:  COZAAR  Take 1 tablet (25 mg total) by mouth once daily.  Start taking on:  November 16, 2019     nitroGLYCERIN 0.4 MG SL tablet  Commonly known as:  NITROSTAT  Place 1 tablet (0.4 mg total) under the tongue every 5 (five) minutes as needed for Chest pain.     ticagrelor 90 mg tablet  Commonly known as:  BRILINTA  Take 1 tablet (90 mg total) by mouth 2 (two) times daily.            Indwelling Lines/Drains at time of discharge:   Lines/Drains/Airways     None                 Time spent on the discharge of patient: 30 minutes  Patient was seen and examined on the date of discharge and determined to be suitable for discharge.         Madhavi Camp MD  Department of Hospital Medicine  [facility]